# Patient Record
Sex: FEMALE | Race: BLACK OR AFRICAN AMERICAN | Employment: PART TIME | URBAN - METROPOLITAN AREA
[De-identification: names, ages, dates, MRNs, and addresses within clinical notes are randomized per-mention and may not be internally consistent; named-entity substitution may affect disease eponyms.]

---

## 2019-02-23 ENCOUNTER — APPOINTMENT (OUTPATIENT)
Dept: CT IMAGING | Age: 34
End: 2019-02-23
Attending: EMERGENCY MEDICINE
Payer: MEDICAID

## 2019-02-23 ENCOUNTER — APPOINTMENT (OUTPATIENT)
Dept: GENERAL RADIOLOGY | Age: 34
End: 2019-02-23
Attending: EMERGENCY MEDICINE
Payer: MEDICAID

## 2019-02-23 ENCOUNTER — HOSPITAL ENCOUNTER (EMERGENCY)
Age: 34
Discharge: HOME | End: 2019-02-23
Attending: EMERGENCY MEDICINE
Payer: MEDICAID

## 2019-02-23 VITALS
HEART RATE: 92 BPM | DIASTOLIC BLOOD PRESSURE: 87 MMHG | OXYGEN SATURATION: 100 % | SYSTOLIC BLOOD PRESSURE: 134 MMHG | RESPIRATION RATE: 16 BRPM | TEMPERATURE: 97.7 F

## 2019-02-23 DIAGNOSIS — Z79.4 TYPE 2 DIABETES MELLITUS WITH HYPERGLYCEMIA, WITH LONG-TERM CURRENT USE OF INSULIN (HCC): ICD-10-CM

## 2019-02-23 DIAGNOSIS — S09.90XA MINOR HEAD INJURY, INITIAL ENCOUNTER: Primary | ICD-10-CM

## 2019-02-23 DIAGNOSIS — E11.65 TYPE 2 DIABETES MELLITUS WITH HYPERGLYCEMIA, WITH LONG-TERM CURRENT USE OF INSULIN (HCC): ICD-10-CM
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
## 2019-02-23 DIAGNOSIS — S16.1XXA STRAIN OF NECK MUSCLE, INITIAL ENCOUNTER: ICD-10-CM

## 2019-02-23 DIAGNOSIS — S70.01XA CONTUSION OF RIGHT HIP, INITIAL ENCOUNTER: ICD-10-CM

## 2019-02-23 LAB
ANION GAP SERPL CALC-SCNC: 7 MMOL/L (ref 3–18)
APPEARANCE UR: CLEAR
BASOPHILS # BLD: 0 K/UL (ref 0–0.1)
BASOPHILS NFR BLD: 1 % (ref 0–2)
BILIRUB UR QL: NEGATIVE
BUN SERPL-MCNC: 17 MG/DL (ref 7–18)
BUN/CREAT SERPL: 31 (ref 12–20)
CALCIUM SERPL-MCNC: 9 MG/DL (ref 8.5–10.1)
CHLORIDE SERPL-SCNC: 104 MMOL/L (ref 100–108)
CO2 SERPL-SCNC: 26 MMOL/L (ref 21–32)
COLOR UR: YELLOW
CREAT SERPL-MCNC: 0.54 MG/DL (ref 0.6–1.3)
DIFFERENTIAL METHOD BLD: ABNORMAL
EOSINOPHIL # BLD: 0.1 K/UL (ref 0–0.4)
EOSINOPHIL NFR BLD: 3 % (ref 0–5)
ERYTHROCYTE [DISTWIDTH] IN BLOOD BY AUTOMATED COUNT: 12.3 % (ref 11.6–14.5)
ETHANOL SERPL-MCNC: 5 MG/DL (ref 0–3)
GLUCOSE BLD STRIP.AUTO-MCNC: 248 MG/DL (ref 70–110)
GLUCOSE SERPL-MCNC: 224 MG/DL (ref 74–99)
GLUCOSE UR STRIP.AUTO-MCNC: >1000 MG/DL
HCG SERPL QL: NEGATIVE
HCT VFR BLD AUTO: 42.2 % (ref 35–45)
HGB BLD-MCNC: 14 G/DL (ref 12–16)
HGB UR QL STRIP: NEGATIVE
KETONES UR QL STRIP.AUTO: NEGATIVE MG/DL
LEUKOCYTE ESTERASE UR QL STRIP.AUTO: NEGATIVE
LYMPHOCYTES # BLD: 1.7 K/UL (ref 0.9–3.6)
LYMPHOCYTES NFR BLD: 39 % (ref 21–52)
MCH RBC QN AUTO: 29.4 PG (ref 24–34)
MCHC RBC AUTO-ENTMCNC: 33.2 G/DL (ref 31–37)
MCV RBC AUTO: 88.5 FL (ref 74–97)
MONOCYTES # BLD: 0.3 K/UL (ref 0.05–1.2)
MONOCYTES NFR BLD: 7 % (ref 3–10)
NEUTS SEG # BLD: 2.2 K/UL (ref 1.8–8)
NEUTS SEG NFR BLD: 50 % (ref 40–73)
NITRITE UR QL STRIP.AUTO: NEGATIVE
PH UR STRIP: 6 [PH] (ref 5–8)
PLATELET # BLD AUTO: 352 K/UL (ref 135–420)
PMV BLD AUTO: 10.2 FL (ref 9.2–11.8)
POTASSIUM SERPL-SCNC: 3.8 MMOL/L (ref 3.5–5.5)
PROT UR STRIP-MCNC: NEGATIVE MG/DL
RBC # BLD AUTO: 4.77 M/UL (ref 4.2–5.3)
SODIUM SERPL-SCNC: 137 MMOL/L (ref 136–145)
SP GR UR REFRACTOMETRY: >1.03 (ref 1–1.03)
UROBILINOGEN UR QL STRIP.AUTO: 0.2 EU/DL (ref 0.2–1)
WBC # BLD AUTO: 4.4 K/UL (ref 4.6–13.2)

## 2019-02-23 PROCEDURE — 81003 URINALYSIS AUTO W/O SCOPE: CPT

## 2019-02-23 PROCEDURE — 84703 CHORIONIC GONADOTROPIN ASSAY: CPT

## 2019-02-23 PROCEDURE — 99285 EMERGENCY DEPT VISIT HI MDM: CPT

## 2019-02-23 PROCEDURE — 73502 X-RAY EXAM HIP UNI 2-3 VIEWS: CPT

## 2019-02-23 PROCEDURE — 72125 CT NECK SPINE W/O DYE: CPT

## 2019-02-23 PROCEDURE — 85025 COMPLETE CBC W/AUTO DIFF WBC: CPT

## 2019-02-23 PROCEDURE — 70450 CT HEAD/BRAIN W/O DYE: CPT

## 2019-02-23 PROCEDURE — 80048 BASIC METABOLIC PNL TOTAL CA: CPT

## 2019-02-23 PROCEDURE — 74011250637 HC RX REV CODE- 250/637: Performed by: EMERGENCY MEDICINE

## 2019-02-23 PROCEDURE — 82962 GLUCOSE BLOOD TEST: CPT

## 2019-02-23 PROCEDURE — 80307 DRUG TEST PRSMV CHEM ANLYZR: CPT

## 2019-02-23 RX ORDER — CARISOPRODOL 350 MG/1
350 TABLET ORAL
Qty: 30 TAB | Refills: 0 | Status: SHIPPED | OUTPATIENT
Start: 2019-02-23

## 2019-02-23 RX ORDER — ACETAMINOPHEN 325 MG/1
650 TABLET ORAL
Qty: 20 TAB | Refills: 0 | Status: SHIPPED | OUTPATIENT
Start: 2019-02-23

## 2019-02-23 RX ORDER — NAPROXEN 500 MG/1
500 TABLET ORAL 2 TIMES DAILY WITH MEALS
Qty: 20 TAB | Refills: 0 | Status: SHIPPED | OUTPATIENT
Start: 2019-02-23 | End: 2019-03-05

## 2019-02-23 RX ORDER — ACETAMINOPHEN 500 MG
1000 TABLET ORAL
Status: COMPLETED | OUTPATIENT
Start: 2019-02-23 | End: 2019-02-23

## 2019-02-23 RX ADMIN — ACETAMINOPHEN 1000 MG: 500 TABLET, FILM COATED ORAL at 02:17

## 2019-02-23 NOTE — LETTER
[facility] [place]  [facility] EMERGENCY DEPT  [address]  [address] [place] [address] [phone]    Work/School Note    Date: 2/23/2019    To Whom It May concern:    Jeremías Jean was seen and treated today in the emergency room by the following provider(s):  Attending Provider: Alee Bernnan MD.      Jeremías Jean may return to work on 02/25/2019. Sincerely,            Laverne Flood.  Avtar Damon MD
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
## 2019-02-23 NOTE — LETTER
[facility] [place]  [address] [facility] DEPT  [address]  [address] [place] [address] [phone]    Work/School Note    Date: 2/23/2019    To Whom It May concern:    Carmella Wilks was seen and treated today in the emergency room by the following :  Mike Mcclain MD.    Carmella Wilks may return to work on 2/28/2019.     Sincerely,        Mike Mcclain MD
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
## 2021-12-29 ENCOUNTER — HOSPITAL ENCOUNTER (EMERGENCY)
Age: 36
Discharge: HOME | End: 2021-12-29
Attending: EMERGENCY MEDICINE
Payer: COMMERCIAL

## 2021-12-29 VITALS
SYSTOLIC BLOOD PRESSURE: 128 MMHG | HEART RATE: 110 BPM | TEMPERATURE: 98.4 F | DIASTOLIC BLOOD PRESSURE: 84 MMHG | OXYGEN SATURATION: 99 % | RESPIRATION RATE: 16 BRPM

## 2021-12-29 DIAGNOSIS — Z20.822 PERSON UNDER INVESTIGATION FOR COVID-19: ICD-10-CM

## 2021-12-29 DIAGNOSIS — J11.1 INFLUENZA-LIKE ILLNESS: Primary | ICD-10-CM

## 2021-12-29 LAB
FLUAV AG NPH QL IA: NEGATIVE
FLUBV AG NOSE QL IA: NEGATIVE
SARS-COV-2, COV2: NORMAL

## 2021-12-29 PROCEDURE — 99281 EMR DPT VST MAYX REQ PHY/QHP: CPT

## 2021-12-29 PROCEDURE — U0003 INFECTIOUS AGENT DETECTION BY NUCLEIC ACID (DNA OR RNA); SEVERE ACUTE RESPIRATORY SYNDROME CORONAVIRUS 2 (SARS-COV-2) (CORONAVIRUS DISEASE [COVID-19]), AMPLIFIED PROBE TECHNIQUE, MAKING USE OF HIGH THROUGHPUT TECHNOLOGIES AS DESCRIBED BY CMS-2020-01-R: HCPCS

## 2021-12-29 PROCEDURE — 87804 INFLUENZA ASSAY W/OPTIC: CPT

## 2021-12-29 NOTE — Clinical Note
[facility] [place]  [facility] EMERGENCY DEPT  [address] [facility] [address] [phone]    Work/School Note    Date: 12/29/2021     To Whom It May concern:    Jazzmine Bernard was evaluated by the following provider(s):  Attending Provider: [place] virus is suspected. Per the CDC guidelines we recommend home isolation until the following conditions are all met:    1. At least 10 days have passed since symptoms first appeared and  2. At least 24 hours have passed since last fever without the use of fever-reducing medications and  3.  Symptoms (e.g., cough, shortness of breath) have improved      Sincerely,          Jesus Matthews LPN

## 2021-12-29 NOTE — Clinical Note
[facility] [place]  [facility] EMERGENCY DEPT  [address] [facility] NEWS [address]  [address] [phone]    Work/School Note    Date: 12/29/2021     To Whom It May concern:    Addis Gutierrez was evaluated by the following provider(s):  Attending Provider: Yoselyn Al [address] [place] [address] virus is suspected. Per the CDC guidelines we recommend home isolation until the following conditions are all met:    1. At least 10 days have passed since symptoms first appeared and  2. At least 24 hours have passed since last fever without the use of fever-reducing medications and  3.  Symptoms (e.g., cough, shortness of breath) have improved      Sincerely,          Carmen Pulido MD

## 2021-12-30 ENCOUNTER — PATIENT OUTREACH (OUTPATIENT)
Dept: CASE MANAGEMENT | Age: 36
End: 2021-12-30

## 2021-12-30 LAB — SARS-COV-2, NAA: DETECTED

## 2021-12-30 NOTE — ED TRIAGE NOTES
\" I have been having body aches, cough, sore throat and chills since yesterday. \" Pt is vaccinated vss nad noted

## 2021-12-30 NOTE — PROGRESS NOTES
Patient contacted regarding COVID-19 risk. Discussed COVID-19 related testing which was pending at this time. Test results were pending. Patient informed of results, if available? pending. Outreach made within 2 business days of discharge: Yes     contacted the patient by telephone to perform post discharge call. Verified name and  with patient as identifiers. Provided introduction to self, and reason for call due to viral symptoms of infection and/or exposure to COVID-19. Advance Care Planning:   Does patient have an Advance Directive: currently not on file; education provided     Patient presented to emergency department/flu clinic with complaints of viral symptoms/exposure to COVID. Patient reports symptoms are the same. Due to no new or worsening symptoms the RN CTN/ACM was not notified for escalation. This author reviewed discharge instructions, medical action plan and red flags such as increased shortness of breath, increasing fever, worsening cough or chest pain with patient who verbalized understanding. Discussed exposure protocols and quarantine with CDC Guidelines What To Do If You Are Sick    Patient who was given an opportunity for questions and concerns. The patient agrees to contact their health care provider for questions related to their healthcare. Author provided contact information for future reference. Plan for follow-up call in 5-7 days based on severity of symptoms and risk factors.

## 2021-12-30 NOTE — ED PROVIDER NOTES
EMERGENCY DEPARTMENT HISTORY AND PHYSICAL EXAM    Date: 12/29/2021  Patient Name: Chiquis Ulloa    History of Presenting Illness     Chief Complaint   Patient presents with    Flu Like Symptoms         History Provided By: Patient    8:12 PM  Chiquis Ulloa is a 39 y.o. female with PMHX of diabetes, fully vaccinated for COVID-19 but has not yet gotten booster, diabetes who presents to the emergency department C/O cough, body aches, rhinorrhea, sore throat, congestion. Patient reports the cough and sore throat started a week ago and the rest of the symptoms started yesterday. No clear relieving or exacerbating factors identified. Denies any shortness of breath, fever, chest pain, diarrhea, vomiting. Has had contacts with patients with similar symptoms. She does not smoke. PCP: No primary care provider on file. Past History       Past Medical History:  No past medical history on file. Past Surgical History:  No past surgical history on file. Family History:  No family history on file. Social History:  Social History     Tobacco Use    Smoking status: Not on file    Smokeless tobacco: Not on file   Substance Use Topics    Alcohol use: Not on file    Drug use: Not on file       Allergies:  No Known Allergies      Review of Systems   Review of Systems   Constitutional: Negative for fever. HENT: Positive for congestion, rhinorrhea and sore throat. Respiratory: Positive for cough. Negative for shortness of breath. Cardiovascular: Negative for chest pain. Musculoskeletal: Positive for arthralgias and myalgias. All other systems reviewed and are negative.         Physical Exam     Vitals:    12/29/21 1938   BP: 128/84   Pulse: (!) 110   Resp: 16   Temp: 98.4 °F (36.9 °C)   SpO2: 99%     Physical Exam    Nursing notes and vital signs reviewed    Constitutional: Non toxic appearing, moderate distress  Head: Normocephalic, Atraumatic  Eyes: EOMI  Neck: Supple  Cardiovascular: Tachycardic and regular rhythm, no murmurs, rubs, or gallops  Chest: Normal work of breathing and chest excursion bilaterally  Lungs: Clear to ausculation bilaterally  Abdomen: Soft, non tender, non distended  Back: No evidence of trauma or deformity  Extremities: No evidence of trauma or deformity  Skin: Warm and dry, normal cap refill  Neuro: Alert and appropriate  Psychiatric: Normal mood and affect      Diagnostic Study Results     Labs -   No results found for this or any previous visit (from the past 12 hour(s)). Radiologic Studies -   No orders to display     CT Results  (Last 48 hours)    None        CXR Results  (Last 48 hours)    None          Medications given in the ED-  Medications - No data to display      Medical Decision Making   I am the first provider for this patient. I reviewed the vital signs, available nursing notes, past medical history, past surgical history, family history and social history. Vital Signs-Reviewed the patient's vital signs. Pulse Oximetry Analysis - 99% on room air, not hypoxic     Records Reviewed: Nursing Notes    Provider Notes (Medical Decision Making): Dennys Castillo is a 39 y.o. female presenting with URI signs and symptoms in the setting of high community prevalence of COVID-19. Patient is hemodynamically stable except for mild tachycardia without evidence of respiratory distress. Not hypoxic on room air. Covid-19 testing was sent and is pending. Plan for discharge with strict quarantine instructions and return precautions. Patient understands and agrees with this plan. Procedures:  Procedures    ED Course:       Diagnosis and Disposition     Critical Care: None    DISCHARGE NOTE:    Bhargav Arguelles's  results have been reviewed with her. She has been counseled regarding her diagnosis, treatment, and plan.   She verbally conveys understanding and agreement of the signs, symptoms, diagnosis, treatment and prognosis and additionally agrees to follow up as discussed. She also agrees with the care-plan and conveys that all of her questions have been answered. I have also provided discharge instructions for her that include: educational information regarding their diagnosis and treatment, and list of reasons why they would want to return to the ED prior to their follow-up appointment, should her condition change. She has been provided with education for proper emergency department utilization. CLINICAL IMPRESSION:    1. Influenza-like illness    2. Person under investigation for COVID-19        PLAN:  1. D/C Home  2. There are no discharge medications for this patient. 3.   Follow-up Information     Follow up With Specialties Details Why Contact Info    Chris Marie MD Family Medicine Schedule an appointment as soon as possible for a visit  Or Your Primary Care Doctor [address] [place] [id number] [phone]      [facility] EMERGENCY DEPT Emergency Medicine  If symptoms worsen [address] [address]  [phone]        _______________________________      Please note that this dictation was completed with [employer], the computer voice recognition software. Quite often unanticipated grammatical, syntax, homophones, and other interpretive errors are inadvertently transcribed by the computer software. Please disregard these errors. Please excuse any errors that have escaped final proofreading.

## 2021-12-30 NOTE — PROGRESS NOTES
Unable to reach     Attempted to contact the patient by telephone. Left message with information to return call. Will attempt to contact the patient at a later time.

## 2022-01-06 ENCOUNTER — PATIENT OUTREACH (OUTPATIENT)
Dept: CASE MANAGEMENT | Age: 37
End: 2022-01-06

## 2022-01-06 NOTE — PROGRESS NOTES
Patient contacted regarding COVID-19 risk. Discussed COVID-19 related testing which was available at this time. Test results were positive. Patient informed of results, if available? yes       contacted the patient by telephone to perform follow-up assessment. Verified name and  with patient as identifiers. Patient has following risk factors of: diabetes. Symptoms reviewed with patient who verbalized the following symptoms: cough and Sore throat, congestion, body aches. Due to no new or worsening symptoms encounter was not routed to provider for escalation. Interventions to address risk factors: Scheduled appointment with PCP-Will schedule    Educated patient about risk for severe COVID-19 due to risk factors according to CDC guidelines.  reviewed discharge instructions, medical action plan and red flag symptoms with the patient who verbalized understanding. Discussed COVID vaccination status: yes. Education provided on COVID-19 vaccination as appropriate. Discussed exposure protocols and quarantine with CDC Guidelines. Patient was given an opportunity to verbalize any questions and concerns and agrees to contact  or health care provider for questions related to their healthcare. Reviewed and educated patient on any new and changed medications related to discharge diagnosis     Was patient discharged with a pulse oximeter? no Discussed and confirmed pulse oximeter discharge instructions and when to notify provider or seek emergency care.  provided contact information. No further follow-up call identified based on severity of symptoms and risk factors.

## 2023-03-25 ENCOUNTER — HOSPITAL ENCOUNTER (EMERGENCY)
Facility: HOSPITAL | Age: 38
Discharge: HOME | End: 2023-03-25
Attending: EMERGENCY MEDICINE

## 2023-03-25 VITALS
RESPIRATION RATE: 20 BRPM | SYSTOLIC BLOOD PRESSURE: 126 MMHG | BODY MASS INDEX: 22.32 KG/M2 | DIASTOLIC BLOOD PRESSURE: 75 MMHG | HEART RATE: 103 BPM | WEIGHT: 126 LBS | TEMPERATURE: 98.1 F | OXYGEN SATURATION: 98 % | HEIGHT: 63 IN

## 2023-03-25 DIAGNOSIS — S13.9XXA NECK SPRAIN, INITIAL ENCOUNTER: ICD-10-CM
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
## 2023-03-25 DIAGNOSIS — S33.5XXA LUMBAR SPRAIN, INITIAL ENCOUNTER: ICD-10-CM

## 2023-03-25 LAB
GLUCOSE BLD STRIP.AUTO-MCNC: 169 MG/DL (ref 70–110)
GLUCOSE BLD-MCNC: 169 MG/DL

## 2023-03-25 PROCEDURE — 6360000002 HC RX W HCPCS: Performed by: EMERGENCY MEDICINE

## 2023-03-25 PROCEDURE — 82962 GLUCOSE BLOOD TEST: CPT

## 2023-03-25 PROCEDURE — 96372 THER/PROPH/DIAG INJ SC/IM: CPT

## 2023-03-25 PROCEDURE — 99284 EMERGENCY DEPT VISIT MOD MDM: CPT

## 2023-03-25 RX ORDER — CYCLOBENZAPRINE HCL 10 MG
10 TABLET ORAL 3 TIMES DAILY PRN
Qty: 21 TABLET | Refills: 0 | Status: SHIPPED | OUTPATIENT
Start: 2023-03-25 | End: 2023-03-25 | Stop reason: SDUPTHER

## 2023-03-25 RX ORDER — KETOROLAC TROMETHAMINE 30 MG/ML
60 INJECTION, SOLUTION INTRAMUSCULAR; INTRAVENOUS
Status: COMPLETED | OUTPATIENT
Start: 2023-03-25 | End: 2023-03-25

## 2023-03-25 RX ORDER — KETOROLAC TROMETHAMINE 10 MG/1
10 TABLET, FILM COATED ORAL EVERY 8 HOURS PRN
Qty: 15 TABLET | Refills: 0 | Status: SHIPPED | OUTPATIENT
Start: 2023-03-25 | End: 2023-03-25 | Stop reason: SDUPTHER

## 2023-03-25 RX ORDER — KETOROLAC TROMETHAMINE 10 MG/1
10 TABLET, FILM COATED ORAL EVERY 8 HOURS PRN
Qty: 15 TABLET | Refills: 0 | Status: SHIPPED | OUTPATIENT
Start: 2023-03-25

## 2023-03-25 RX ORDER — CYCLOBENZAPRINE HCL 10 MG
10 TABLET ORAL 3 TIMES DAILY PRN
Qty: 21 TABLET | Refills: 0 | Status: SHIPPED | OUTPATIENT
Start: 2023-03-25 | End: 2023-04-04

## 2023-03-25 RX ADMIN — KETOROLAC TROMETHAMINE 60 MG: 30 INJECTION, SOLUTION INTRAMUSCULAR at 05:52

## 2023-03-25 NOTE — Clinical Note
Dariel Shone was seen and treated in our emergency department on 3/25/2023. She may return to work on . If you have any questions or concerns, please don't hesitate to call.       Bibi Mcgarry MD

## 2023-03-25 NOTE — Clinical Note
Manuel Conner was seen and treated in our emergency department on 3/25/2023. She may return to work on 04/17/2023. If you have any questions or concerns, please don't hesitate to call.       Austen Jane MD

## 2023-03-25 NOTE — Clinical Note
Queen Justice was seen and treated in our emergency department on 3/25/2023. She may return to work on . If you have any questions or concerns, please don't hesitate to call.       Sam Urrutia MD
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
## 2023-03-25 NOTE — Clinical Note
Margarito Peraza was seen and treated in our emergency department on 3/25/2023. She may return to work on 03/27/2023. If you have any questions or concerns, please don't hesitate to call.       Aracelis Colon MD

## 2023-03-25 NOTE — Clinical Note
Charles Lawton was seen and treated in our emergency department on 3/25/2023. She may return to work on 03/27/2023. If you have any questions or concerns, please don't hesitate to call.       Deepika Phoenix MD

## 2023-03-25 NOTE — ED PROVIDER NOTES
Normal work of breathing and chest excursion bilaterally  Lungs: Clear to ausculation bilaterally  Abdomen: Soft, non tender, non distended, normoactive bowel sounds  Back: No evidence of trauma or deformity  Extremities: No evidence of trauma or deformity, no LE edema  Skin: Warm and dry, normal cap refill  Neuro: Alert and appropriate, CN intact, normal speech, strength and sensation full and symmetric bilaterally, normal gait, normal coordination  Psychiatric: Normal mood and affect     DIAGNOSTIC RESULTS   LABS:     Labs Reviewed   POCT GLUCOSE - Abnormal; Notable for the following components:       Result Value    POC Glucose 169 (*)     All other components within normal limits   POCT GLUCOSE - Normal        EKG:   EKG interpretation: (Preliminary)  EKG read by Dr. Cee Iglesias ***     RADIOLOGY:  Non-plain film images such as CT, Ultrasound and MRI are read by the radiologist. Plain radiographic images are visualized and preliminarily interpreted by the ED Provider with the below findings:     ***     Interpretation per the Radiologist below, if available at the time of this note:     No orders to display        PROCEDURES   Unless otherwise noted below, none  Procedures     CRITICAL CARE TIME   ***    EMERGENCY DEPARTMENT COURSE and DIFFERENTIAL DIAGNOSIS/MDM   Vitals:    Vitals:    03/25/23 0356   BP: 126/75   Pulse: (!) 103   Resp: 20   Temp: 98.1 °F (36.7 °C)   TempSrc: Temporal   SpO2: 98%   Weight: 126 lb (57.2 kg)   Height: 5' 3\" (1.6 m)        Patient was given the following medications:  Medications   ketorolac (TORADOL) injection 60 mg (60 mg IntraMUSCular Given 3/25/23 0597)       CONSULTS: (Who and What was discussed)  None    Chronic Conditions: ***    Social Determinants affecting Dx or Tx: {Social Barriers:10665}    Records Reviewed (source and summary): {Records review:65434::\"Old medical records. \",\"Previous electrocardiograms. \",\"Nursing notes. \",\"Previous radiology (TYLENOL) 325 MG tablet Take 650 mg by mouth every 4 hours as neededHistorical Med      carisoprodol (SOMA) 350 MG tablet Take 350 mg by mouth every 8 hours as needed. Historical Med             DISCONTINUED MEDICATIONS:  Discharge Medication List as of 3/25/2023  6:19 AM          I am the Primary Clinician of Record. Soto Terrazas MD (electronically signed)    (Please note that parts of this dictation were completed with voice recognition software. Quite often unanticipated grammatical, syntax, homophones, and other interpretive errors are inadvertently transcribed by the computer software. Please disregards these errors.  Please excuse any errors that have escaped final proofreading.)       Pascual Bridges MD  03/29/23 8107

## 2023-03-25 NOTE — Clinical Note
Annelise Vallejo was seen and treated in our emergency department on 3/25/2023. She may return to work on . If you have any questions or concerns, please don't hesitate to call.       Alvin Awad MD

## 2024-05-24 ENCOUNTER — HOSPITAL ENCOUNTER (OUTPATIENT)
Facility: HOSPITAL | Age: 39
Discharge: HOME | End: 2024-05-27

## 2024-05-24 ENCOUNTER — HOSPITAL ENCOUNTER (OUTPATIENT)
Facility: HOSPITAL | Age: 39
End: 2024-05-24
Payer: MEDICAID

## 2024-05-24 ENCOUNTER — TRANSCRIBE ORDERS (OUTPATIENT)
Facility: HOSPITAL | Age: 39
End: 2024-05-24

## 2024-05-24 DIAGNOSIS — S82.002A CLOSED NONDISPLACED FRACTURE OF LEFT PATELLA, UNSPECIFIED FRACTURE MORPHOLOGY, INITIAL ENCOUNTER: Primary | ICD-10-CM

## 2024-05-24 DIAGNOSIS — S82.002A CLOSED NONDISPLACED FRACTURE OF LEFT PATELLA, UNSPECIFIED FRACTURE MORPHOLOGY, INITIAL ENCOUNTER: ICD-10-CM

## 2024-05-24 LAB
EKG ATRIAL RATE: 103 BPM
EKG DIAGNOSIS: NORMAL
EKG P AXIS: 43 DEGREES
EKG P-R INTERVAL: 140 MS
EKG Q-T INTERVAL: 332 MS
EKG QRS DURATION: 80 MS
EKG QTC CALCULATION (BAZETT): 434 MS
EKG R AXIS: 88 DEGREES
EKG T AXIS: 21 DEGREES
EKG VENTRICULAR RATE: 103 BPM
SENTARA SPECIMEN COLLECTION: NORMAL

## 2024-05-24 PROCEDURE — 99001 SPECIMEN HANDLING PT-LAB: CPT

## 2024-05-24 PROCEDURE — 93010 ELECTROCARDIOGRAM REPORT: CPT | Performed by: INTERNAL MEDICINE

## 2024-05-24 PROCEDURE — 93005 ELECTROCARDIOGRAM TRACING: CPT

## 2024-05-29 ENCOUNTER — ANESTHESIA EVENT (OUTPATIENT)
Facility: HOSPITAL | Age: 39
End: 2024-05-29
Payer: MEDICAID

## 2024-05-29 NOTE — PERIOP NOTE
Hga1c is 10.7. Faxed HgA1C report to office 5-28-24. [facility] for Danelle 5-29-24

## 2024-05-29 NOTE — PERIOP NOTE
Faxed a message and LVM for Danelle at Dr. Hicks with Dr. Green's comment \"Better glucose control would be optimal but cleared if DOS blood glucose in range given this is not elective\". Informed her that if blood glucose is too high on dos, the patient could be postpone. Please ask patient to be mindful of diet and diabetes management in the days leading up to surgery.
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
## 2024-05-31 NOTE — DISCHARGE INSTRUCTIONS
Dr. Hicks's Post-Operative Instructions Patella Fracture    Diet:  1. Begin with liquids and light foods such as Jell-O and soups.  2. Advance as tolerated to your regular diet if not nauseated.    First 24 hours:  1. Be in the care of a responsible adult.  2. Do not drive or operate machinery.  3. Do not drink alcoholic beverages.    Activities:  1. Elevate the limb above hip and preferably above chest for 48 hours.  2. Ice should be applied to the knee  in a waterproof bag for 15-30 minutes each hour while awake for first 48 hours.  3. Do not bear weight on affected limb  4. Follow-up with Dr. Hicks in 7-10 days post-operatively.    Wound Care:  1. Maintain your postoperative dressing until follow-up.  2. Keep the surgical incisions dry until your sutures are removed when you see your doctor. Use a plastic bag with rubber bands to cover the limb during showers. Immersing the limb in water is to be avoided.    Medications:  1. Strong oral narcotic pain medications have been prescribed for the first few days. Use only as directed. No pain medication is capable of taking away all the pain. Taking your pills at regular intervals will give you the best chance of having less pain.  2. If you need a refill PLEASE PLAN AHEAD. Call our office during regular hours (8-5).  3. Do not combine with alcoholic beverages.  4. Be careful as you walk, climb stairs or drive as mild dizziness is not unusual.  5. Do not take medications that have not been prescribed by your surgeon.  6. You may switch to over the counter pain medication of your choice as you become more comfortable.    WHEN TO CALL YOUR SURGEON:  1. Significant swelling or any new numbness in the limb that was operated on  2. Unrelenting pain  3. Fever or Chills  4. Redness around incisions  5. Color change in foot or toes  6. Continuous drainage or bleeding from wounds (a small amount of drainage is expected)  7. Any other worrisome condition    WHEN TO CALL

## 2024-06-04 ENCOUNTER — HOSPITAL ENCOUNTER (OUTPATIENT)
Facility: HOSPITAL | Age: 39
Setting detail: OUTPATIENT SURGERY
Discharge: HOME | End: 2024-06-04
Attending: ORTHOPAEDIC SURGERY | Admitting: ORTHOPAEDIC SURGERY
Payer: MEDICAID

## 2024-06-04 ENCOUNTER — APPOINTMENT (OUTPATIENT)
Facility: HOSPITAL | Age: 39
End: 2024-06-04
Attending: ORTHOPAEDIC SURGERY
Payer: MEDICAID

## 2024-06-04 ENCOUNTER — ANESTHESIA (OUTPATIENT)
Facility: HOSPITAL | Age: 39
End: 2024-06-04
Payer: MEDICAID

## 2024-06-04 VITALS
HEIGHT: 63 IN | OXYGEN SATURATION: 98 % | RESPIRATION RATE: 14 BRPM | WEIGHT: 127.1 LBS | SYSTOLIC BLOOD PRESSURE: 129 MMHG | TEMPERATURE: 98 F | HEART RATE: 108 BPM | DIASTOLIC BLOOD PRESSURE: 85 MMHG | BODY MASS INDEX: 22.52 KG/M2

## 2024-06-04 LAB
GLUCOSE BLD STRIP.AUTO-MCNC: 120 MG/DL (ref 70–110)
GLUCOSE BLD STRIP.AUTO-MCNC: 215 MG/DL (ref 70–110)
HCG UR QL: NEGATIVE

## 2024-06-04 PROCEDURE — 82962 GLUCOSE BLOOD TEST: CPT

## 2024-06-04 PROCEDURE — 3600000002 HC SURGERY LEVEL 2 BASE: Performed by: ORTHOPAEDIC SURGERY

## 2024-06-04 PROCEDURE — 6360000002 HC RX W HCPCS: Performed by: SPECIALIST

## 2024-06-04 PROCEDURE — 2720000010 HC SURG SUPPLY STERILE: Performed by: ORTHOPAEDIC SURGERY

## 2024-06-04 PROCEDURE — 6360000002 HC RX W HCPCS: Performed by: REGISTERED NURSE

## 2024-06-04 PROCEDURE — 2709999900 HC NON-CHARGEABLE SUPPLY: Performed by: ORTHOPAEDIC SURGERY

## 2024-06-04 PROCEDURE — 2580000003 HC RX 258: Performed by: ORTHOPAEDIC SURGERY

## 2024-06-04 PROCEDURE — C1713 ANCHOR/SCREW BN/BN,TIS/BN: HCPCS | Performed by: ORTHOPAEDIC SURGERY

## 2024-06-04 PROCEDURE — 7100000000 HC PACU RECOVERY - FIRST 15 MIN: Performed by: ORTHOPAEDIC SURGERY

## 2024-06-04 PROCEDURE — 3700000000 HC ANESTHESIA ATTENDED CARE: Performed by: ORTHOPAEDIC SURGERY

## 2024-06-04 PROCEDURE — 7100000001 HC PACU RECOVERY - ADDTL 15 MIN: Performed by: ORTHOPAEDIC SURGERY

## 2024-06-04 PROCEDURE — 73560 X-RAY EXAM OF KNEE 1 OR 2: CPT

## 2024-06-04 PROCEDURE — 3600000012 HC SURGERY LEVEL 2 ADDTL 15MIN: Performed by: ORTHOPAEDIC SURGERY

## 2024-06-04 PROCEDURE — 3700000001 HC ADD 15 MINUTES (ANESTHESIA): Performed by: ORTHOPAEDIC SURGERY

## 2024-06-04 PROCEDURE — 7100000010 HC PHASE II RECOVERY - FIRST 15 MIN: Performed by: ORTHOPAEDIC SURGERY

## 2024-06-04 PROCEDURE — 6370000000 HC RX 637 (ALT 250 FOR IP): Performed by: ORTHOPAEDIC SURGERY

## 2024-06-04 PROCEDURE — 6360000002 HC RX W HCPCS: Performed by: ORTHOPAEDIC SURGERY

## 2024-06-04 PROCEDURE — 64447 NJX AA&/STRD FEMORAL NRV IMG: CPT | Performed by: SPECIALIST

## 2024-06-04 PROCEDURE — 7100000011 HC PHASE II RECOVERY - ADDTL 15 MIN: Performed by: ORTHOPAEDIC SURGERY

## 2024-06-04 PROCEDURE — 81025 URINE PREGNANCY TEST: CPT

## 2024-06-04 PROCEDURE — C1769 GUIDE WIRE: HCPCS | Performed by: ORTHOPAEDIC SURGERY

## 2024-06-04 PROCEDURE — 2500000003 HC RX 250 WO HCPCS: Performed by: REGISTERED NURSE

## 2024-06-04 PROCEDURE — 2500000003 HC RX 250 WO HCPCS: Performed by: ORTHOPAEDIC SURGERY

## 2024-06-04 PROCEDURE — 6370000000 HC RX 637 (ALT 250 FOR IP): Performed by: SPECIALIST

## 2024-06-04 DEVICE — CANNULATED SCREW
Type: IMPLANTABLE DEVICE | Site: PATELLA | Status: FUNCTIONAL
Brand: ASNIS

## 2024-06-04 RX ORDER — TRANEXAMIC ACID 10 MG/ML
1000 INJECTION, SOLUTION INTRAVENOUS ONCE
Status: DISCONTINUED | OUTPATIENT
Start: 2024-06-04 | End: 2024-06-04 | Stop reason: HOSPADM

## 2024-06-04 RX ORDER — SODIUM CHLORIDE, SODIUM LACTATE, POTASSIUM CHLORIDE, CALCIUM CHLORIDE 600; 310; 30; 20 MG/100ML; MG/100ML; MG/100ML; MG/100ML
INJECTION, SOLUTION INTRAVENOUS CONTINUOUS
Status: DISCONTINUED | OUTPATIENT
Start: 2024-06-04 | End: 2024-06-04 | Stop reason: HOSPADM

## 2024-06-04 RX ORDER — SODIUM CHLORIDE 0.9 % (FLUSH) 0.9 %
5-40 SYRINGE (ML) INJECTION EVERY 12 HOURS SCHEDULED
Status: DISCONTINUED | OUTPATIENT
Start: 2024-06-04 | End: 2024-06-04 | Stop reason: HOSPADM

## 2024-06-04 RX ORDER — MIDAZOLAM HYDROCHLORIDE 1 MG/ML
INJECTION INTRAMUSCULAR; INTRAVENOUS PRN
Status: DISCONTINUED | OUTPATIENT
Start: 2024-06-04 | End: 2024-06-04 | Stop reason: SDUPTHER

## 2024-06-04 RX ORDER — SODIUM CHLORIDE 0.9 % (FLUSH) 0.9 %
5-40 SYRINGE (ML) INJECTION PRN
Status: DISCONTINUED | OUTPATIENT
Start: 2024-06-04 | End: 2024-06-04 | Stop reason: HOSPADM

## 2024-06-04 RX ORDER — SODIUM CHLORIDE 9 MG/ML
INJECTION, SOLUTION INTRAVENOUS PRN
Status: DISCONTINUED | OUTPATIENT
Start: 2024-06-04 | End: 2024-06-04 | Stop reason: HOSPADM

## 2024-06-04 RX ORDER — ONDANSETRON 2 MG/ML
4 INJECTION INTRAMUSCULAR; INTRAVENOUS
Status: DISCONTINUED | OUTPATIENT
Start: 2024-06-04 | End: 2024-06-04 | Stop reason: HOSPADM

## 2024-06-04 RX ORDER — FENTANYL CITRATE 50 UG/ML
25 INJECTION, SOLUTION INTRAMUSCULAR; INTRAVENOUS EVERY 5 MIN PRN
Status: DISCONTINUED | OUTPATIENT
Start: 2024-06-04 | End: 2024-06-04 | Stop reason: HOSPADM

## 2024-06-04 RX ORDER — LIDOCAINE HYDROCHLORIDE 20 MG/ML
INJECTION, SOLUTION EPIDURAL; INFILTRATION; INTRACAUDAL; PERINEURAL PRN
Status: DISCONTINUED | OUTPATIENT
Start: 2024-06-04 | End: 2024-06-04 | Stop reason: SDUPTHER

## 2024-06-04 RX ORDER — DIPHENHYDRAMINE HYDROCHLORIDE 50 MG/ML
12.5 INJECTION INTRAMUSCULAR; INTRAVENOUS
Status: DISCONTINUED | OUTPATIENT
Start: 2024-06-04 | End: 2024-06-04 | Stop reason: HOSPADM

## 2024-06-04 RX ORDER — MIDAZOLAM HYDROCHLORIDE 2 MG/2ML
INJECTION, SOLUTION INTRAMUSCULAR; INTRAVENOUS
Status: COMPLETED
Start: 2024-06-04 | End: 2024-06-04

## 2024-06-04 RX ORDER — METOCLOPRAMIDE HYDROCHLORIDE 5 MG/ML
INJECTION INTRAMUSCULAR; INTRAVENOUS PRN
Status: DISCONTINUED | OUTPATIENT
Start: 2024-06-04 | End: 2024-06-04 | Stop reason: SDUPTHER

## 2024-06-04 RX ORDER — FENTANYL CITRATE 50 UG/ML
INJECTION, SOLUTION INTRAMUSCULAR; INTRAVENOUS PRN
Status: DISCONTINUED | OUTPATIENT
Start: 2024-06-04 | End: 2024-06-04 | Stop reason: SDUPTHER

## 2024-06-04 RX ORDER — NALOXONE HYDROCHLORIDE 0.4 MG/ML
INJECTION, SOLUTION INTRAMUSCULAR; INTRAVENOUS; SUBCUTANEOUS PRN
Status: DISCONTINUED | OUTPATIENT
Start: 2024-06-04 | End: 2024-06-04 | Stop reason: HOSPADM

## 2024-06-04 RX ORDER — PROPOFOL 10 MG/ML
INJECTION, EMULSION INTRAVENOUS PRN
Status: DISCONTINUED | OUTPATIENT
Start: 2024-06-04 | End: 2024-06-04 | Stop reason: SDUPTHER

## 2024-06-04 RX ORDER — ACETAMINOPHEN 325 MG/1
650 TABLET ORAL
Status: DISCONTINUED | OUTPATIENT
Start: 2024-06-04 | End: 2024-06-04 | Stop reason: HOSPADM

## 2024-06-04 RX ORDER — GLYCOPYRROLATE 0.2 MG/ML
INJECTION INTRAMUSCULAR; INTRAVENOUS PRN
Status: DISCONTINUED | OUTPATIENT
Start: 2024-06-04 | End: 2024-06-04 | Stop reason: SDUPTHER

## 2024-06-04 RX ORDER — ONDANSETRON 2 MG/ML
INJECTION INTRAMUSCULAR; INTRAVENOUS PRN
Status: DISCONTINUED | OUTPATIENT
Start: 2024-06-04 | End: 2024-06-04 | Stop reason: SDUPTHER

## 2024-06-04 RX ORDER — MELOXICAM 15 MG/1
15 TABLET ORAL DAILY
Qty: 30 TABLET | Refills: 0
Start: 2024-06-04 | End: 2024-07-04

## 2024-06-04 RX ORDER — ASPIRIN 81 MG/1
81 TABLET, CHEWABLE ORAL EVERY 12 HOURS
Qty: 42 TABLET | Refills: 0
Start: 2024-06-04 | End: 2024-06-25

## 2024-06-04 RX ORDER — PHENYLEPHRINE HCL IN 0.9% NACL 1 MG/10 ML
SYRINGE (ML) INTRAVENOUS PRN
Status: DISCONTINUED | OUTPATIENT
Start: 2024-06-04 | End: 2024-06-04 | Stop reason: SDUPTHER

## 2024-06-04 RX ORDER — CEPHALEXIN 500 MG/1
1000 CAPSULE ORAL EVERY 8 HOURS
Qty: 4 CAPSULE | Refills: 0
Start: 2024-06-04 | End: 2024-06-05

## 2024-06-04 RX ORDER — HYDROMORPHONE HYDROCHLORIDE 1 MG/ML
0.5 INJECTION, SOLUTION INTRAMUSCULAR; INTRAVENOUS; SUBCUTANEOUS EVERY 5 MIN PRN
Status: DISCONTINUED | OUTPATIENT
Start: 2024-06-04 | End: 2024-06-04 | Stop reason: HOSPADM

## 2024-06-04 RX ORDER — LABETALOL HYDROCHLORIDE 5 MG/ML
5 INJECTION, SOLUTION INTRAVENOUS
Status: DISCONTINUED | OUTPATIENT
Start: 2024-06-04 | End: 2024-06-04 | Stop reason: HOSPADM

## 2024-06-04 RX ORDER — MIDAZOLAM HYDROCHLORIDE 2 MG/2ML
2 INJECTION, SOLUTION INTRAMUSCULAR; INTRAVENOUS
Status: DISCONTINUED | OUTPATIENT
Start: 2024-06-04 | End: 2024-06-04 | Stop reason: HOSPADM

## 2024-06-04 RX ORDER — INSULIN LISPRO 100 [IU]/ML
0-16 INJECTION, SOLUTION INTRAVENOUS; SUBCUTANEOUS ONCE
Status: COMPLETED | OUTPATIENT
Start: 2024-06-04 | End: 2024-06-04

## 2024-06-04 RX ORDER — SODIUM CHLORIDE, SODIUM LACTATE, POTASSIUM CHLORIDE, AND CALCIUM CHLORIDE .6; .31; .03; .02 G/100ML; G/100ML; G/100ML; G/100ML
IRRIGANT IRRIGATION PRN
Status: DISCONTINUED | OUTPATIENT
Start: 2024-06-04 | End: 2024-06-04 | Stop reason: ALTCHOICE

## 2024-06-04 RX ORDER — ROPIVACAINE HYDROCHLORIDE 5 MG/ML
INJECTION, SOLUTION EPIDURAL; INFILTRATION; PERINEURAL
Status: COMPLETED | OUTPATIENT
Start: 2024-06-04 | End: 2024-06-04

## 2024-06-04 RX ORDER — BUPIVACAINE HYDROCHLORIDE 5 MG/ML
INJECTION, SOLUTION EPIDURAL; INTRACAUDAL PRN
Status: DISCONTINUED | OUTPATIENT
Start: 2024-06-04 | End: 2024-06-04 | Stop reason: ALTCHOICE

## 2024-06-04 RX ADMIN — INSULIN LISPRO 4 UNITS: 100 INJECTION, SOLUTION INTRAVENOUS; SUBCUTANEOUS at 15:07

## 2024-06-04 RX ADMIN — WATER 2000 MG: 1 INJECTION INTRAMUSCULAR; INTRAVENOUS; SUBCUTANEOUS at 13:14

## 2024-06-04 RX ADMIN — HYDROMORPHONE HYDROCHLORIDE 0.5 MG: 1 INJECTION, SOLUTION INTRAMUSCULAR; INTRAVENOUS; SUBCUTANEOUS at 15:19

## 2024-06-04 RX ADMIN — ROPIVACAINE HYDROCHLORIDE 20 ML: 5 INJECTION, SOLUTION EPIDURAL; INFILTRATION; PERINEURAL at 12:25

## 2024-06-04 RX ADMIN — SODIUM CHLORIDE, SODIUM LACTATE, POTASSIUM CHLORIDE, AND CALCIUM CHLORIDE: 600; 310; 30; 20 INJECTION, SOLUTION INTRAVENOUS at 10:18

## 2024-06-04 RX ADMIN — HYDROMORPHONE HYDROCHLORIDE 0.5 MG: 1 INJECTION, SOLUTION INTRAMUSCULAR; INTRAVENOUS; SUBCUTANEOUS at 15:24

## 2024-06-04 RX ADMIN — MEPIVACAINE HYDROCHLORIDE 10 ML: 15 INJECTION, SOLUTION EPIDURAL; INFILTRATION at 12:25

## 2024-06-04 RX ADMIN — Medication 50 MCG: at 13:42

## 2024-06-04 RX ADMIN — PROPOFOL 150 MG: 10 INJECTION, EMULSION INTRAVENOUS at 13:04

## 2024-06-04 RX ADMIN — Medication 100 MCG: at 14:18

## 2024-06-04 RX ADMIN — METOCLOPRAMIDE 5 MG: 5 INJECTION, SOLUTION INTRAMUSCULAR; INTRAVENOUS at 13:29

## 2024-06-04 RX ADMIN — Medication 50 MCG: at 13:20

## 2024-06-04 RX ADMIN — Medication 100 MCG: at 13:58

## 2024-06-04 RX ADMIN — Medication 100 MCG: at 14:06

## 2024-06-04 RX ADMIN — MIDAZOLAM 2 MG: 1 INJECTION INTRAMUSCULAR; INTRAVENOUS at 12:20

## 2024-06-04 RX ADMIN — FENTANYL CITRATE 50 MCG: 50 INJECTION, SOLUTION INTRAMUSCULAR; INTRAVENOUS at 13:30

## 2024-06-04 RX ADMIN — Medication 100 MCG: at 14:36

## 2024-06-04 RX ADMIN — SODIUM CHLORIDE, SODIUM LACTATE, POTASSIUM CHLORIDE, AND CALCIUM CHLORIDE: 600; 310; 30; 20 INJECTION, SOLUTION INTRAVENOUS at 13:00

## 2024-06-04 RX ADMIN — ONDANSETRON HYDROCHLORIDE 4 MG: 2 INJECTION INTRAMUSCULAR; INTRAVENOUS at 13:31

## 2024-06-04 RX ADMIN — FENTANYL CITRATE 50 MCG: 50 INJECTION, SOLUTION INTRAMUSCULAR; INTRAVENOUS at 13:01

## 2024-06-04 RX ADMIN — PROPOFOL 50 MG: 10 INJECTION, EMULSION INTRAVENOUS at 13:30

## 2024-06-04 RX ADMIN — GLYCOPYRROLATE 0.2 MG: 0.2 INJECTION INTRAMUSCULAR; INTRAVENOUS at 13:36

## 2024-06-04 RX ADMIN — Medication 100 MCG: at 14:13

## 2024-06-04 RX ADMIN — LIDOCAINE HYDROCHLORIDE 60 MG: 20 INJECTION, SOLUTION EPIDURAL; INFILTRATION; INTRACAUDAL; PERINEURAL at 13:02

## 2024-06-04 RX ADMIN — Medication 50 MCG: at 13:43

## 2024-06-04 ASSESSMENT — PAIN DESCRIPTION - LOCATION
LOCATION: KNEE

## 2024-06-04 ASSESSMENT — PAIN DESCRIPTION - DESCRIPTORS
DESCRIPTORS: ACHING
DESCRIPTORS: ACHING

## 2024-06-04 ASSESSMENT — PAIN DESCRIPTION - ONSET
ONSET: ON-GOING
ONSET: ON-GOING

## 2024-06-04 ASSESSMENT — PAIN - FUNCTIONAL ASSESSMENT
PAIN_FUNCTIONAL_ASSESSMENT: ACTIVITIES ARE NOT PREVENTED
PAIN_FUNCTIONAL_ASSESSMENT: ACTIVITIES ARE NOT PREVENTED

## 2024-06-04 ASSESSMENT — PAIN DESCRIPTION - PAIN TYPE
TYPE: SURGICAL PAIN
TYPE: SURGICAL PAIN

## 2024-06-04 ASSESSMENT — PAIN DESCRIPTION - FREQUENCY
FREQUENCY: CONTINUOUS
FREQUENCY: CONTINUOUS

## 2024-06-04 ASSESSMENT — PAIN DESCRIPTION - ORIENTATION
ORIENTATION: LEFT
ORIENTATION: LEFT

## 2024-06-04 ASSESSMENT — PAIN SCALES - GENERAL
PAINLEVEL_OUTOF10: 8
PAINLEVEL_OUTOF10: 8
PAINLEVEL_OUTOF10: 10

## 2024-06-04 NOTE — ANESTHESIA PRE PROCEDURE
Department of Anesthesiology  Preprocedure Note       Name:  Abhilash Suh   Age:  38 y.o.  :  1985                                          MRN:  [id number]         Date:  2024      Surgeon: Surgeon(s):  Jaydon Hicks DO    Procedure: Procedure(s):  LEFT KNEE PATELLA OPEN REDUCTION INTERNAL FIXATION WITH C-ARM    Medications prior to admission:   Prior to Admission medications    Medication Sig Start Date End Date Taking? Authorizing Provider   naproxen (NAPROSYN) 500 MG tablet Take 1 tablet by mouth 2 times daily 24   Gus Mayers MD   HYDROcodone-acetaminophen (NORCO) 5-325 MG per tablet Take 1 tablet by mouth every 6 hours as needed for Pain. 24   Gus Mayers MD   ibuprofen (ADVIL;MOTRIN) 800 MG tablet Take 1 tablet by mouth every 6 hours as needed for Pain    Gus Mayers MD   vitamin D (ERGOCALCIFEROL) 1.25 MG (57940 UT) CAPS capsule Take 1 capsule by mouth once a week Indications:     Gus Mayers MD   insulin detemir (LEVEMIR) 100 UNIT/ML injection vial Inject 30 Units into the skin nightly    Gus Mayers MD   insulin aspart (NOVOLOG) 100 UNIT/ML injection vial Inject 20 Units into the skin 3 times daily (before meals)    Gus Mayers MD   acetaminophen (TYLENOL) 325 MG tablet Take 2 tablets by mouth every 4 hours as needed 19   Automatic Reconciliation, Ar       Current medications:    Current Facility-Administered Medications   Medication Dose Route Frequency Provider Last Rate Last Admin    lactated ringers IV soln infusion   IntraVENous Continuous Jaydon Hicks  mL/hr at 24 1158 NoRateChange at 24 1158    ceFAZolin (ANCEF) 2,000 mg in sterile water 20 mL IV syringe  2,000 mg IntraVENous On Call to OR Jaydon Hicks DO        tranexamic acid-NaCl IVPB premix 1,000 mg  1,000 mg IntraVENous Once Jaydon Hicks DO        tranexamic acid-NaCl IVPB premix 1,000 mg  1,000 mg

## 2024-06-04 NOTE — ANESTHESIA PROCEDURE NOTES
Peripheral Block    Patient location during procedure: holding area  Reason for block: procedure for pain, post-op pain management and at surgeon's request  Start time: 6/4/2024 12:20 PM  End time: 6/4/2024 12:25 PM  Staffing  Performed: anesthesiologist   Anesthesiologist: Jimmy Nicole MD  Performed by: Jimmy Nicole MD  Authorized by: Jimmy Nicole MD    Preanesthetic Checklist  Completed: patient identified, IV checked, site marked, risks and benefits discussed, surgical/procedural consents, equipment checked, pre-op evaluation, timeout performed, anesthesia consent given, oxygen available, monitors applied/VS acknowledged, fire risk safety assessment completed and verbalized and blood product R/B/A discussed and consented  Peripheral Block   Patient position: supine  Prep: ChloraPrep  Provider prep: sterile gloves and mask  Patient monitoring: cardiac monitor, continuous pulse ox, continuous capnometry, frequent blood pressure checks, IV access, oxygen and responsive to questions  Block type: Femoral  Femoral crease  Laterality: left  Injection technique: single-shot  Guidance: nerve stimulator and ultrasound guided    Needle   Needle type: insulated echogenic nerve stimulator needle   Needle gauge: 21 G  Needle localization: ultrasound guidance and nerve stimulator  Needle insertion depth: 4 cm  Needle length: 8 cm  Assessment   Injection assessment: negative aspiration for heme, no paresthesia on injection, local visualized surrounding nerve on ultrasound and no intravascular symptoms  Paresthesia pain: none  Slow fractionated injection: yes  Hemodynamics: stable  Outcomes: uncomplicated    Medications Administered  mepivacaine (CARBOCAINE) injection 1.5% - Perineural   10 mL - 6/4/2024 12:25:00 PM  ropivacaine (NAROPIN) injection 0.5% - Perineural   20 mL - 6/4/2024 12:25:00 PM

## 2024-06-04 NOTE — INTERVAL H&P NOTE
After Visit Summary   10/20/2017    Daniel Keene    MRN: [id number]           Patient Information     Date Of Birth          2015        Visit Information        Provider Department      10/20/2017 9:00 AM  [facility]        Today's Diagnoses     Need for prophylactic vaccination and inoculation against influenza    -  1       Follow-ups after your visit        Who to contact     If you have questions or need follow up information about today's clinic visit or your schedule please contact [facility] directly at [phone].  Normal or non-critical lab and imaging results will be communicated to you by FatTailhart, letter or phone within 4 business days after the clinic has received the results. If you do not hear from us within 7 days, please contact the clinic through Store Eyest or phone. If you have a critical or abnormal lab result, we will notify you by phone as soon as possible.  Submit refill requests through Panoramic Power or call your pharmacy and they will forward the refill request to us. Please allow 3 business days for your refill to be completed.          Additional Information About Your Visit        MyChart Information     Pan[employer] gives you secure access to your electronic health record. If you see a primary care provider, you can also send messages to your care team and make appointments. If you have questions, please call your primary care clinic.  If you do not have a primary care provider, please call [phone] and they will assist you.        Care EveryWhere ID     This is your Care EveryWhere ID. This could be used by other organizations to access your [facility] medical records  [id number]         Blood Pressure from Last 3 Encounters:   02/13/17 91/56   02/09/14 74/38   02/07/14 86/53    Weight from Last 3 Encounters:   09/13/17 38 lb (17.2 kg) (82 %)*   09/11/17 38 lb (17.2 kg) (82 %)*   07/11/17 39 lb 9.6 oz (18 kg) (92 %)*     * Growth  Update History & Physical    The patient's History and Physical  was reviewed with the patient and I examined the patient. There was no change. The surgical site was confirmed by the patient and me.     Plan: The risks, benefits, expected outcome, and alternative to the recommended procedure have been discussed with the patient. Patient understands and wants to proceed with the procedure.     Electronically signed by Jaydon Hicks DO on 6/4/2024 at 9:44 AM       percentiles are based on [facility] 2-20 Years data.              We Performed the Following     FLU VAC, SPLIT VIRUS IM > 3 YO (QUADRIVALENT) [68519]     Vaccine Administration, Initial [67181]        Primary Care Provider Office Phone # Fax #    Debora Parker [phone] [address] [facility] [address]        Equal Access to Services     [facility]: Hadii aad ku hadasho Soomaali, waaxda luqadaha, qaybta kaalmada adeegyada, waxay idiin hayaan adeeg kharash la'aan . So [facility] [phone].    ATENCIÓN: Si habla español, tiene a wilks disposición servicios gratuitos de asistencia lingüística. Llame al [phone].    We comply with applicable federal civil rights laws and [place] laws. We do not discriminate on the basis of race, color, national origin, age, disability, sex, sexual orientation, or gender identity.            Thank you!     Thank you for choosing [facility]  for your care. Our goal is always to provide you with excellent care. Hearing back from our patients is one way we can continue to improve our services. Please take a few minutes to complete the written survey that you may receive in the mail after your visit with us. Thank you!             Your Updated Medication List - Protect others around you: Learn how to safely use, store and throw away your medicines at www.disposemymeds.org.          This list is accurate as of: 10/20/17  9:01 AM.  Always use your most recent med list.                   Brand Name Dispense Instructions for use Diagnosis    acetaminophen 32 mg/mL solution    TYLENOL    150 mL    Take 5 mLs (160 mg) by mouth every 6 hours as needed for fever or mild pain    Viral syndrome       atropine 1 % ophthalmic solution     1 Bottle    Place 1 drop Into the left eye every other day    Strabismic amblyopia of right eye       azithromycin 200 MG/5ML suspension    ZITHROMAX    15 mL    Shake well and give 5 ml day 1, and 2.5 ml days 2-5     Respiratory illness with fever       hydrocortisone 2.5 % cream     30 g    Apply topically 2 times daily    Bug bites, initial encounter, Ear swelling, left       ibuprofen 100 MG/5ML suspension    CHILD IBUPROFEN    100 mL    Take 6 mLs (120 mg) by mouth every 6 hours as needed for fever or pain    Viral syndrome

## 2024-06-04 NOTE — BRIEF OP NOTE
Brief Postoperative Note      Patient: Abhilash Suh  YOB: 1985  MRN: [id number]    Date of Procedure: 6/4/2024    Pre-Op Diagnosis Codes:     * Closed nondisplaced fracture of left patella, unspecified fracture morphology, initial encounter [S82.002A]    Post-Op Diagnosis: Same       Procedure(s):  LEFT KNEE PATELLA OPEN REDUCTION INTERNAL FIXATION WITH C-ARM    Surgeon(s):  Jaydon Hicks DO    Assistant:  * No surgical staff found *    Anesthesia: General    Estimated Blood Loss (mL): Minimal    Complications: None    Specimens:   * No specimens in log *    Implants:  * No implants in log *      Drains: * No LDAs found *    Findings:  Infection Present At Time Of Surgery (PATOS) (choose all levels that have infection present):  No infection present  Other Findings: CTS and trigger finger index    Electronically signed by Jyadon Hicks DO on 6/4/2024 at 12:49 PM

## 2024-06-04 NOTE — PERIOP NOTE
TRANSFER - OUT REPORT:    Verbal report given to Miri MARION on Abhilash Suh  being transferred to Phase 2 for routine progression of patient care       Report consisted of patient's Situation, Background, Assessment and   Recommendations(SBAR).     Information from the following report(s) Adult Overview, Surgery Report, Intake/Output, and MAR was reviewed with the receiving nurse.           Lines:   Peripheral IV 06/04/24 Right Wrist (Active)   Site Assessment Clean, dry & intact 06/04/24 1531   Line Status Infusing 06/04/24 1531   Phlebitis Assessment No symptoms 06/04/24 1531   Infiltration Assessment 0 06/04/24 1531   Dressing Status Clean, dry & intact 06/04/24 1531   Dressing Type Transparent 06/04/24 1531        Opportunity for questions and clarification was provided.      Patient transported with:  Registered Nurse

## 2024-06-04 NOTE — PERIOP NOTE
TRANSFER - IN REPORT:    Verbal report received from OR RN on Abhilash Suh  being received from OR for routine progression of patient care      Report consisted of patient's Situation, Background, Assessment and   Recommendations(SBAR).     Information from the following report(s) Adult Overview, Surgery Report, Intake/Output, and MAR   was reviewed with the receiving nurse.    Opportunity for questions and clarification was provided.      Assessment completed upon patient's arrival to unit and care assumed.

## 2024-06-04 NOTE — BRIEF OP NOTE
Brief Postoperative Note      Patient: Abhilash Suh  YOB: 1985  MRN: [id number]    Date of Procedure: 6/4/2024    Pre-Op Diagnosis Codes:     * Closed nondisplaced fracture of left patella, unspecified fracture morphology, initial encounter [S82.002A]    Post-Op Diagnosis: Same       Procedure(s):  LEFT KNEE PATELLA OPEN REDUCTION INTERNAL FIXATION WITH C-ARM    Surgeon(s):  Jaydon Hicks DO    Assistant:  Surgical Assistant: James Murcia  Physician Assistant: Geetha Cortez PA-C    Anesthesia: General    Estimated Blood Loss (mL): less than 50     Complications: None    Specimens:   * No specimens in log *    Implants:  Implant Name Type Inv. Item Serial No.  Lot No. LRB No. Used Action   SCREW BNE L36MM DIA4MM NONSTERILE CAROLINE CANC HIP S STL ST - [id number]  SCREW BNE L36MM DIA4MM NONSTERILE CAROLINE CANC HIP S STL ST  [employer] [id number]  Left 2 Implanted   18G Wire     [id number] Left 1 Implanted         Drains: * No LDAs found *    Findings:  Infection Present At Time Of Surgery (PATOS) (choose all levels that have infection present):  No infection present  Other Findings: comminuted displaced patella fracture     Electronically signed by Jaydon Hicks DO on 6/4/2024 at 2:56 PM

## 2024-06-05 NOTE — ANESTHESIA POSTPROCEDURE EVALUATION
.Post-Anesthesia Evaluation & Assessment    Visit Vitals  /85   Pulse (!) 108   Temp 98 °F (36.7 °C) (Oral)   Resp 14   Ht 1.6 m (5' 3\")   Wt 57.7 kg (127 lb 1.6 oz)   SpO2 98%   BMI 22.51 kg/m²       Nausea/Vomiting: no nausea    Post-operative hydration adequate.  Pain score (VAS): 0    Mental status & Level of consciousness: alert and oriented x 3    Neurological status: moves all extremities, sensation grossly intact    Pulmonary status: airway patent, no supplemental oxygen required    Complications related to anesthesia: none    Additional comments:

## 2024-06-05 NOTE — OP NOTE
Soft tissue dissection noted to complete disruption of the retinaculum medial and lateral as well as inferior pole comminution.  At this point, we maintained reduction with a bone reduction tenaculum.  The lateral margin of the articular piece that was fragmented was stabilized with FiberWire with cerclage.  Following that, we utilized a cannulated screw fixation, placement of guidewires, overdrilled, and then measured to a depth of 36 mm into the best overall fit from the superior pole to the inferior pole.  With the maintained reduction, we replaced the screws without difficulty maintaining the reduction.  We then utilized an 18-gauge wire, introduced through the cannulas over the screw in a figure-of-eight pattern about the dorsal aspect to increase the dorsal compression to allow for again healing.  This was appropriately tensioned in the superior corners sequentially, medial and lateral.  Once appropriately tensioned, on evaluation flexion to 100 degrees without separation of the fracture, the remaining portions of the tails were cut and then under folded into the soft tissues as to not disrupt the skin.  At this point, we continued with copious irrigation with dilute Betadine solution followed by saline solution.  Reapproximation of the medial retinaculum was then done with #2 FiberWire as well as the lateral portions.  A small portion of the inferior portion of patellar tendon was then repaired as well from the process of placing the cannulated screws.  The soft tissues were then reapproximated with #1 and then 2-0 Vicryl followed by Prineo at skin.  She will maintain limited weightbearing activity, flexion, maintaining knee immobilizer for any weightbearing transition.  We will continue appropriate pain control and  additional evaluation with followup x-rays.        KELVIN CHARLES DO      JSUSAN/AQS  D:  06/04/2024 15:01:28  T:  06/05/2024 03:09:58  JOB #:  [id number]

## 2024-06-11 ENCOUNTER — HOSPITAL ENCOUNTER (INPATIENT)
Facility: HOSPITAL | Age: 39
LOS: 8 days | Discharge: NURSING, REHAB OR LONG-TERM CARE | DRG: 420 | End: 2024-06-19
Attending: EMERGENCY MEDICINE | Admitting: INTERNAL MEDICINE
Payer: MEDICAID

## 2024-06-11 ENCOUNTER — APPOINTMENT (OUTPATIENT)
Facility: HOSPITAL | Age: 39
DRG: 420 | End: 2024-06-11
Payer: MEDICAID

## 2024-06-11 DIAGNOSIS — E87.20 METABOLIC ACIDOSIS: Primary | ICD-10-CM

## 2024-06-11 DIAGNOSIS — Z98.890 STATUS POST SURGERY: ICD-10-CM

## 2024-06-11 DIAGNOSIS — R11.2 NAUSEA AND VOMITING, UNSPECIFIED VOMITING TYPE: ICD-10-CM

## 2024-06-11 DIAGNOSIS — E87.29 STARVATION KETOACIDOSIS: ICD-10-CM

## 2024-06-11 DIAGNOSIS — T73.0XXA STARVATION KETOACIDOSIS: ICD-10-CM

## 2024-06-11 PROBLEM — K59.00 CONSTIPATION: Status: ACTIVE | Noted: 2024-06-11

## 2024-06-11 LAB
ALBUMIN SERPL-MCNC: 3 G/DL (ref 3.4–5)
ALBUMIN/GLOB SERPL: 0.8 (ref 0.8–1.7)
ALP SERPL-CCNC: 94 U/L (ref 45–117)
ALT SERPL-CCNC: 14 U/L (ref 13–56)
ANION GAP SERPL CALC-SCNC: 13 MMOL/L (ref 3–18)
ANION GAP SERPL CALC-SCNC: 9 MMOL/L (ref 3–18)
APPEARANCE UR: ABNORMAL
AST SERPL-CCNC: 12 U/L (ref 10–38)
BACTERIA URNS QL MICRO: ABNORMAL /HPF
BASE DEFICIT BLD-SCNC: 13.6 MMOL/L
BASOPHILS # BLD: 0 K/UL (ref 0–0.1)
BASOPHILS NFR BLD: 1 % (ref 0–2)
BILIRUB SERPL-MCNC: 0.5 MG/DL (ref 0.2–1)
BILIRUB UR QL: NEGATIVE
BUN SERPL-MCNC: 14 MG/DL (ref 7–18)
BUN SERPL-MCNC: 19 MG/DL (ref 7–18)
BUN/CREAT SERPL: 19 (ref 12–20)
BUN/CREAT SERPL: 23 (ref 12–20)
CALCIUM SERPL-MCNC: 8.5 MG/DL (ref 8.5–10.1)
CALCIUM SERPL-MCNC: 8.6 MG/DL (ref 8.5–10.1)
CHLORIDE SERPL-SCNC: 109 MMOL/L (ref 100–111)
CHLORIDE SERPL-SCNC: 114 MMOL/L (ref 100–111)
CO2 SERPL-SCNC: 15 MMOL/L (ref 21–32)
CO2 SERPL-SCNC: 17 MMOL/L (ref 21–32)
COLOR UR: YELLOW
CREAT SERPL-MCNC: 0.74 MG/DL (ref 0.6–1.3)
CREAT SERPL-MCNC: 0.82 MG/DL (ref 0.6–1.3)
DIFFERENTIAL METHOD BLD: ABNORMAL
EOSINOPHIL # BLD: 0 K/UL (ref 0–0.4)
EOSINOPHIL NFR BLD: 1 % (ref 0–5)
EPITH CASTS URNS QL MICRO: NEGATIVE /LPF (ref 0–5)
ERYTHROCYTE [DISTWIDTH] IN BLOOD BY AUTOMATED COUNT: 11.9 % (ref 11.6–14.5)
GLOBULIN SER CALC-MCNC: 4 G/DL (ref 2–4)
GLUCOSE BLD STRIP.AUTO-MCNC: 182 MG/DL (ref 70–110)
GLUCOSE BLD STRIP.AUTO-MCNC: 188 MG/DL (ref 70–110)
GLUCOSE BLD STRIP.AUTO-MCNC: 190 MG/DL (ref 70–110)
GLUCOSE BLD STRIP.AUTO-MCNC: 264 MG/DL (ref 70–110)
GLUCOSE BLD STRIP.AUTO-MCNC: 283 MG/DL (ref 70–110)
GLUCOSE BLD STRIP.AUTO-MCNC: 306 MG/DL (ref 70–110)
GLUCOSE SERPL-MCNC: 191 MG/DL (ref 74–99)
GLUCOSE SERPL-MCNC: 268 MG/DL (ref 74–99)
GLUCOSE UR STRIP.AUTO-MCNC: >1000 MG/DL
HCO3 BLD-SCNC: 12.3 MMOL/L (ref 22–26)
HCT VFR BLD AUTO: 37.5 % (ref 35–45)
HGB BLD-MCNC: 12.2 G/DL (ref 12–16)
HGB UR QL STRIP: ABNORMAL
IMM GRANULOCYTES # BLD AUTO: 0 K/UL (ref 0–0.04)
IMM GRANULOCYTES NFR BLD AUTO: 1 % (ref 0–0.5)
KETONES UR QL STRIP.AUTO: >160 MG/DL
LACTATE BLD-SCNC: 1.17 MMOL/L (ref 0.4–2)
LEUKOCYTE ESTERASE UR QL STRIP.AUTO: ABNORMAL
LIPASE SERPL-CCNC: 14 U/L (ref 13–75)
LYMPHOCYTES # BLD: 1.4 K/UL (ref 0.9–3.6)
LYMPHOCYTES NFR BLD: 36 % (ref 21–52)
MAGNESIUM SERPL-MCNC: 2 MG/DL (ref 1.6–2.6)
MAGNESIUM SERPL-MCNC: 2.1 MG/DL (ref 1.6–2.6)
MCH RBC QN AUTO: 29.7 PG (ref 24–34)
MCHC RBC AUTO-ENTMCNC: 32.5 G/DL (ref 31–37)
MCV RBC AUTO: 91.2 FL (ref 78–100)
MONOCYTES # BLD: 0.5 K/UL (ref 0.05–1.2)
MONOCYTES NFR BLD: 12 % (ref 3–10)
NEUTS SEG # BLD: 2 K/UL (ref 1.8–8)
NEUTS SEG NFR BLD: 51 % (ref 40–73)
NITRITE UR QL STRIP.AUTO: NEGATIVE
NRBC # BLD: 0 K/UL (ref 0–0.01)
NRBC BLD-RTO: 0 PER 100 WBC
NT PRO BNP: 16 PG/ML (ref 0–450)
PCO2 BLD: 28.7 MMHG (ref 35–45)
PH BLD: 7.24 (ref 7.35–7.45)
PH UR STRIP: 5.5 (ref 5–8)
PHOSPHATE SERPL-MCNC: 2 MG/DL (ref 2.5–4.9)
PLATELET # BLD AUTO: 448 K/UL (ref 135–420)
PMV BLD AUTO: 9 FL (ref 9.2–11.8)
PO2 BLD: 23 MMHG (ref 80–100)
POTASSIUM SERPL-SCNC: 3.8 MMOL/L (ref 3.5–5.5)
POTASSIUM SERPL-SCNC: 4.5 MMOL/L (ref 3.5–5.5)
PROT SERPL-MCNC: 7 G/DL (ref 6.4–8.2)
PROT UR STRIP-MCNC: 30 MG/DL
RBC # BLD AUTO: 4.11 M/UL (ref 4.2–5.3)
RBC #/AREA URNS HPF: ABNORMAL /HPF (ref 0–5)
SAO2 % BLD: 32.3 % (ref 92–97)
SERVICE CMNT-IMP: ABNORMAL
SODIUM SERPL-SCNC: 137 MMOL/L (ref 136–145)
SODIUM SERPL-SCNC: 140 MMOL/L (ref 136–145)
SP GR UR REFRACTOMETRY: >1.03 (ref 1–1.03)
SPECIMEN TYPE: ABNORMAL
TROPONIN I SERPL HS-MCNC: 4 NG/L (ref 0–54)
UROBILINOGEN UR QL STRIP.AUTO: 0.2 EU/DL (ref 0.2–1)
WBC # BLD AUTO: 3.9 K/UL (ref 4.6–13.2)
WBC CASTS URNS QL MICRO: ABNORMAL /LPF
WBC URNS QL MICRO: ABNORMAL /HPF (ref 0–5)
YEAST URNS QL MICRO: ABNORMAL

## 2024-06-11 PROCEDURE — 99285 EMERGENCY DEPT VISIT HI MDM: CPT

## 2024-06-11 PROCEDURE — 96374 THER/PROPH/DIAG INJ IV PUSH: CPT

## 2024-06-11 PROCEDURE — 83735 ASSAY OF MAGNESIUM: CPT

## 2024-06-11 PROCEDURE — 96365 THER/PROPH/DIAG IV INF INIT: CPT

## 2024-06-11 PROCEDURE — 83880 ASSAY OF NATRIURETIC PEPTIDE: CPT

## 2024-06-11 PROCEDURE — 80053 COMPREHEN METABOLIC PANEL: CPT

## 2024-06-11 PROCEDURE — 96375 TX/PRO/DX INJ NEW DRUG ADDON: CPT

## 2024-06-11 PROCEDURE — 2580000003 HC RX 258: Performed by: EMERGENCY MEDICINE

## 2024-06-11 PROCEDURE — 96366 THER/PROPH/DIAG IV INF ADDON: CPT

## 2024-06-11 PROCEDURE — 83605 ASSAY OF LACTIC ACID: CPT

## 2024-06-11 PROCEDURE — 82962 GLUCOSE BLOOD TEST: CPT

## 2024-06-11 PROCEDURE — 82803 BLOOD GASES ANY COMBINATION: CPT

## 2024-06-11 PROCEDURE — 6360000002 HC RX W HCPCS: Performed by: EMERGENCY MEDICINE

## 2024-06-11 PROCEDURE — 2500000003 HC RX 250 WO HCPCS: Performed by: INTERNAL MEDICINE

## 2024-06-11 PROCEDURE — 6370000000 HC RX 637 (ALT 250 FOR IP): Performed by: INTERNAL MEDICINE

## 2024-06-11 PROCEDURE — 71275 CT ANGIOGRAPHY CHEST: CPT

## 2024-06-11 PROCEDURE — 96376 TX/PRO/DX INJ SAME DRUG ADON: CPT

## 2024-06-11 PROCEDURE — 2580000003 HC RX 258: Performed by: INTERNAL MEDICINE

## 2024-06-11 PROCEDURE — 84484 ASSAY OF TROPONIN QUANT: CPT

## 2024-06-11 PROCEDURE — 87086 URINE CULTURE/COLONY COUNT: CPT

## 2024-06-11 PROCEDURE — 85025 COMPLETE CBC W/AUTO DIFF WBC: CPT

## 2024-06-11 PROCEDURE — 6370000000 HC RX 637 (ALT 250 FOR IP): Performed by: EMERGENCY MEDICINE

## 2024-06-11 PROCEDURE — 83690 ASSAY OF LIPASE: CPT

## 2024-06-11 PROCEDURE — 84100 ASSAY OF PHOSPHORUS: CPT

## 2024-06-11 PROCEDURE — 2000000000 HC ICU R&B

## 2024-06-11 PROCEDURE — 83036 HEMOGLOBIN GLYCOSYLATED A1C: CPT

## 2024-06-11 PROCEDURE — 74177 CT ABD & PELVIS W/CONTRAST: CPT

## 2024-06-11 PROCEDURE — 6360000004 HC RX CONTRAST MEDICATION: Performed by: EMERGENCY MEDICINE

## 2024-06-11 PROCEDURE — 81001 URINALYSIS AUTO W/SCOPE: CPT

## 2024-06-11 RX ORDER — FLUCONAZOLE 2 MG/ML
200 INJECTION, SOLUTION INTRAVENOUS
Status: COMPLETED | OUTPATIENT
Start: 2024-06-11 | End: 2024-06-11

## 2024-06-11 RX ORDER — POLYETHYLENE GLYCOL 3350 17 G/17G
17 POWDER, FOR SOLUTION ORAL DAILY PRN
Status: DISCONTINUED | OUTPATIENT
Start: 2024-06-11 | End: 2024-06-19 | Stop reason: HOSPADM

## 2024-06-11 RX ORDER — DEXTROSE MONOHYDRATE AND SODIUM CHLORIDE 5; .9 G/100ML; G/100ML
INJECTION, SOLUTION INTRAVENOUS CONTINUOUS
Status: DISCONTINUED | OUTPATIENT
Start: 2024-06-11 | End: 2024-06-11

## 2024-06-11 RX ORDER — DOCUSATE SODIUM 100 MG/1
200 CAPSULE, LIQUID FILLED ORAL DAILY
Status: DISCONTINUED | OUTPATIENT
Start: 2024-06-11 | End: 2024-06-19 | Stop reason: HOSPADM

## 2024-06-11 RX ORDER — ONDANSETRON 4 MG/1
4 TABLET, ORALLY DISINTEGRATING ORAL EVERY 8 HOURS PRN
Status: DISCONTINUED | OUTPATIENT
Start: 2024-06-11 | End: 2024-06-19 | Stop reason: HOSPADM

## 2024-06-11 RX ORDER — SODIUM CHLORIDE 0.9 % (FLUSH) 0.9 %
5-40 SYRINGE (ML) INJECTION EVERY 12 HOURS SCHEDULED
Status: DISCONTINUED | OUTPATIENT
Start: 2024-06-11 | End: 2024-06-19 | Stop reason: HOSPADM

## 2024-06-11 RX ORDER — POTASSIUM CHLORIDE 7.45 MG/ML
10 INJECTION INTRAVENOUS PRN
Status: DISCONTINUED | OUTPATIENT
Start: 2024-06-11 | End: 2024-06-17 | Stop reason: SDUPTHER

## 2024-06-11 RX ORDER — DEXTROSE MONOHYDRATE AND SODIUM CHLORIDE 5; .45 G/100ML; G/100ML
INJECTION, SOLUTION INTRAVENOUS CONTINUOUS PRN
Status: DISCONTINUED | OUTPATIENT
Start: 2024-06-11 | End: 2024-06-19 | Stop reason: HOSPADM

## 2024-06-11 RX ORDER — HYDROCODONE BITARTRATE AND ACETAMINOPHEN 5; 325 MG/1; MG/1
1 TABLET ORAL EVERY 6 HOURS PRN
Status: DISCONTINUED | OUTPATIENT
Start: 2024-06-11 | End: 2024-06-19 | Stop reason: HOSPADM

## 2024-06-11 RX ORDER — MAGNESIUM SULFATE IN WATER 40 MG/ML
2000 INJECTION, SOLUTION INTRAVENOUS PRN
Status: DISCONTINUED | OUTPATIENT
Start: 2024-06-11 | End: 2024-06-19 | Stop reason: HOSPADM

## 2024-06-11 RX ORDER — BISACODYL 5 MG/1
10 TABLET, DELAYED RELEASE ORAL DAILY PRN
Status: DISCONTINUED | OUTPATIENT
Start: 2024-06-11 | End: 2024-06-19 | Stop reason: HOSPADM

## 2024-06-11 RX ORDER — ACETAMINOPHEN 325 MG/1
650 TABLET ORAL EVERY 6 HOURS PRN
Status: DISCONTINUED | OUTPATIENT
Start: 2024-06-11 | End: 2024-06-19 | Stop reason: HOSPADM

## 2024-06-11 RX ORDER — POTASSIUM CHLORIDE 7.45 MG/ML
10 INJECTION INTRAVENOUS PRN
Status: DISCONTINUED | OUTPATIENT
Start: 2024-06-11 | End: 2024-06-19 | Stop reason: HOSPADM

## 2024-06-11 RX ORDER — SODIUM CHLORIDE 9 MG/ML
INJECTION, SOLUTION INTRAVENOUS PRN
Status: DISCONTINUED | OUTPATIENT
Start: 2024-06-11 | End: 2024-06-19 | Stop reason: HOSPADM

## 2024-06-11 RX ORDER — POLYETHYLENE GLYCOL 3350 17 G/17G
17 POWDER, FOR SOLUTION ORAL DAILY
Status: DISCONTINUED | OUTPATIENT
Start: 2024-06-11 | End: 2024-06-17

## 2024-06-11 RX ORDER — ONDANSETRON 2 MG/ML
4 INJECTION INTRAMUSCULAR; INTRAVENOUS
Status: COMPLETED | OUTPATIENT
Start: 2024-06-11 | End: 2024-06-11

## 2024-06-11 RX ORDER — DEXTROSE MONOHYDRATE, SODIUM CHLORIDE, AND POTASSIUM CHLORIDE 50; 1.49; 9 G/1000ML; G/1000ML; G/1000ML
INJECTION, SOLUTION INTRAVENOUS CONTINUOUS
Status: DISCONTINUED | OUTPATIENT
Start: 2024-06-11 | End: 2024-06-12

## 2024-06-11 RX ORDER — SODIUM CHLORIDE 9 MG/ML
INJECTION, SOLUTION INTRAVENOUS CONTINUOUS
Status: DISCONTINUED | OUTPATIENT
Start: 2024-06-11 | End: 2024-06-11

## 2024-06-11 RX ORDER — ACETAMINOPHEN 325 MG/1
650 TABLET ORAL EVERY 4 HOURS PRN
Status: DISCONTINUED | OUTPATIENT
Start: 2024-06-11 | End: 2024-06-19 | Stop reason: HOSPADM

## 2024-06-11 RX ORDER — OXYCODONE HYDROCHLORIDE AND ACETAMINOPHEN 5; 325 MG/1; MG/1
1 TABLET ORAL EVERY 4 HOURS PRN
Status: DISCONTINUED | OUTPATIENT
Start: 2024-06-11 | End: 2024-06-19 | Stop reason: HOSPADM

## 2024-06-11 RX ORDER — ONDANSETRON 2 MG/ML
4 INJECTION INTRAMUSCULAR; INTRAVENOUS EVERY 6 HOURS PRN
Status: DISCONTINUED | OUTPATIENT
Start: 2024-06-11 | End: 2024-06-19 | Stop reason: HOSPADM

## 2024-06-11 RX ORDER — ONDANSETRON 2 MG/ML
4 INJECTION INTRAMUSCULAR; INTRAVENOUS ONCE
Status: COMPLETED | OUTPATIENT
Start: 2024-06-11 | End: 2024-06-11

## 2024-06-11 RX ORDER — MAGNESIUM SULFATE IN WATER 40 MG/ML
2000 INJECTION, SOLUTION INTRAVENOUS PRN
Status: DISCONTINUED | OUTPATIENT
Start: 2024-06-11 | End: 2024-06-17 | Stop reason: SDUPTHER

## 2024-06-11 RX ORDER — ASPIRIN 81 MG/1
81 TABLET, CHEWABLE ORAL EVERY 12 HOURS
Status: DISCONTINUED | OUTPATIENT
Start: 2024-06-11 | End: 2024-06-19 | Stop reason: HOSPADM

## 2024-06-11 RX ORDER — SODIUM CHLORIDE 0.9 % (FLUSH) 0.9 %
5-40 SYRINGE (ML) INJECTION PRN
Status: DISCONTINUED | OUTPATIENT
Start: 2024-06-11 | End: 2024-06-19 | Stop reason: HOSPADM

## 2024-06-11 RX ORDER — SODIUM CHLORIDE, SODIUM LACTATE, POTASSIUM CHLORIDE, AND CALCIUM CHLORIDE .6; .31; .03; .02 G/100ML; G/100ML; G/100ML; G/100ML
1000 INJECTION, SOLUTION INTRAVENOUS ONCE
Status: COMPLETED | OUTPATIENT
Start: 2024-06-11 | End: 2024-06-11

## 2024-06-11 RX ORDER — ACETAMINOPHEN 650 MG/1
650 SUPPOSITORY RECTAL EVERY 6 HOURS PRN
Status: DISCONTINUED | OUTPATIENT
Start: 2024-06-11 | End: 2024-06-19 | Stop reason: HOSPADM

## 2024-06-11 RX ORDER — 0.9 % SODIUM CHLORIDE 0.9 %
1000 INTRAVENOUS SOLUTION INTRAVENOUS ONCE
Status: COMPLETED | OUTPATIENT
Start: 2024-06-11 | End: 2024-06-11

## 2024-06-11 RX ORDER — POTASSIUM CHLORIDE 20 MEQ/1
40 TABLET, EXTENDED RELEASE ORAL PRN
Status: DISCONTINUED | OUTPATIENT
Start: 2024-06-11 | End: 2024-06-19 | Stop reason: HOSPADM

## 2024-06-11 RX ORDER — ENOXAPARIN SODIUM 100 MG/ML
40 INJECTION SUBCUTANEOUS DAILY
Status: DISCONTINUED | OUTPATIENT
Start: 2024-06-11 | End: 2024-06-11

## 2024-06-11 RX ADMIN — ONDANSETRON 4 MG: 2 INJECTION INTRAMUSCULAR; INTRAVENOUS at 19:06

## 2024-06-11 RX ADMIN — IOPAMIDOL 100 ML: 755 INJECTION, SOLUTION INTRAVENOUS at 15:31

## 2024-06-11 RX ADMIN — DEXTROSE AND SODIUM CHLORIDE: 5; 450 INJECTION, SOLUTION INTRAVENOUS at 18:13

## 2024-06-11 RX ADMIN — SODIUM CHLORIDE, POTASSIUM CHLORIDE, SODIUM LACTATE AND CALCIUM CHLORIDE 1000 ML: 600; 310; 30; 20 INJECTION, SOLUTION INTRAVENOUS at 15:59

## 2024-06-11 RX ADMIN — DOCUSATE SODIUM 200 MG: 100 CAPSULE, LIQUID FILLED ORAL at 21:09

## 2024-06-11 RX ADMIN — SODIUM CHLORIDE, PRESERVATIVE FREE 10 ML: 5 INJECTION INTRAVENOUS at 21:09

## 2024-06-11 RX ADMIN — SODIUM CHLORIDE 4.5 UNITS/HR: 9 INJECTION, SOLUTION INTRAVENOUS at 18:01

## 2024-06-11 RX ADMIN — Medication 12.5 MG: at 16:34

## 2024-06-11 RX ADMIN — SODIUM CHLORIDE 1000 ML: 9 INJECTION, SOLUTION INTRAVENOUS at 14:11

## 2024-06-11 RX ADMIN — WATER 2000 MG: 1 INJECTION INTRAMUSCULAR; INTRAVENOUS; SUBCUTANEOUS at 19:06

## 2024-06-11 RX ADMIN — SODIUM CHLORIDE 6.1 UNITS/HR: 9 INJECTION, SOLUTION INTRAVENOUS at 19:21

## 2024-06-11 RX ADMIN — FLUCONAZOLE 200 MG: 200 INJECTION, SOLUTION INTRAVENOUS at 20:38

## 2024-06-11 RX ADMIN — ONDANSETRON 4 MG: 2 INJECTION INTRAMUSCULAR; INTRAVENOUS at 14:10

## 2024-06-11 RX ADMIN — ASPIRIN 81 MG: 81 TABLET, CHEWABLE ORAL at 21:09

## 2024-06-11 RX ADMIN — POLYETHYLENE GLYCOL 3350 17 G: 17 POWDER, FOR SOLUTION ORAL at 21:09

## 2024-06-11 RX ADMIN — POTASSIUM CHLORIDE, DEXTROSE MONOHYDRATE AND SODIUM CHLORIDE: 150; 5; 900 INJECTION, SOLUTION INTRAVENOUS at 22:50

## 2024-06-11 ASSESSMENT — PAIN SCALES - GENERAL: PAINLEVEL_OUTOF10: 0

## 2024-06-11 NOTE — ED TRIAGE NOTES
Pt arrived via EMS. C/C N/V and constipation. Pt had knee surgery on last Tuesday 6/4. Pt able to eat a little bit but issues keeping things down. Pt thinks last bowel movement was the Saturday prior to surgery.         Pt able to ambulate on her own at home.

## 2024-06-11 NOTE — ED PROVIDER NOTES
hospitalist return call for admission. [ELIZABETH]      ED Course User Index  [JM] Winston Self MD  [ELIZABETH] Santo Vela, DO       None    Medical Decision Making     SCREENING TOOLS:  None    CLINICAL MANAGEMENT TOOLS:  Not Applicable    Is this patient to be included in the SEP-1 core measure? No Exclusion criteria - the patient is NOT to be included for SEP-1 Core Measure due to: 2+ SIRS criteria are not met    DDX: Postop nausea, postop anorexia, dehydration, PE    DISCUSSION:  This appears to be a moderate condition.  This appears to be an acute condition.    38 y.o. female with tachycardia, decreased oral intake, constipation since her surgery last week.  In evaluation of the above differential diagnosis, consideration was given to the following tests and treatments.  Patient presenting tachycardic which is likely due to poor p.o. intake especially fluids over the last week.  IV fluids were started in the ED.  Although patient also has exertional dyspnea so will CTA for PE.  Due to lack of bowel movement over the last 9 days, will also CT scan abdomen and pelvis.  The decision to perform testing and results were discussed with the patient. Results of testing and disposition are pending at the time of sign out. Please see the accepting physician's note from the same day for amplifying details surrounding disposition.      ADDITIONAL CONSIDERATIONS:  None    SMOKING CESSATION:   None    Critical Care Time:     I have independently provided 66 minutes of non-concurrent critical care time involved in lab review, consultations with specialist, family decision-making, and documentation. This time does not include time spent on separately billable procedures.  During this entire length of time I was immediately available to the patient.    Critical Care:  The reason for providing this level of medical care for this critically ill patient was due a critical illness that impaired one or more vital organ systems such that

## 2024-06-12 ENCOUNTER — APPOINTMENT (OUTPATIENT)
Facility: HOSPITAL | Age: 39
DRG: 420 | End: 2024-06-12
Payer: MEDICAID

## 2024-06-12 PROBLEM — N39.0 UTI (URINARY TRACT INFECTION): Status: ACTIVE | Noted: 2024-06-12

## 2024-06-12 PROBLEM — E10.10 DIABETIC KETOACIDOSIS WITHOUT COMA ASSOCIATED WITH TYPE 1 DIABETES MELLITUS (HCC): Status: ACTIVE | Noted: 2024-06-12

## 2024-06-12 LAB
ALBUMIN SERPL-MCNC: 2.4 G/DL (ref 3.4–5)
ANION GAP SERPL CALC-SCNC: 4 MMOL/L (ref 3–18)
ANION GAP SERPL CALC-SCNC: 8 MMOL/L (ref 3–18)
BUN SERPL-MCNC: 12 MG/DL (ref 7–18)
BUN SERPL-MCNC: 8 MG/DL (ref 7–18)
BUN/CREAT SERPL: 16 (ref 12–20)
BUN/CREAT SERPL: 17 (ref 12–20)
CALCIUM SERPL-MCNC: 8.1 MG/DL (ref 8.5–10.1)
CALCIUM SERPL-MCNC: 8.1 MG/DL (ref 8.5–10.1)
CHLORIDE SERPL-SCNC: 115 MMOL/L (ref 100–111)
CHLORIDE SERPL-SCNC: 117 MMOL/L (ref 100–111)
CO2 SERPL-SCNC: 19 MMOL/L (ref 21–32)
CO2 SERPL-SCNC: 21 MMOL/L (ref 21–32)
CREAT SERPL-MCNC: 0.51 MG/DL (ref 0.6–1.3)
CREAT SERPL-MCNC: 0.69 MG/DL (ref 0.6–1.3)
EST. AVERAGE GLUCOSE BLD GHB EST-MCNC: 223 MG/DL
GLUCOSE BLD STRIP.AUTO-MCNC: 100 MG/DL (ref 70–110)
GLUCOSE BLD STRIP.AUTO-MCNC: 143 MG/DL (ref 70–110)
GLUCOSE BLD STRIP.AUTO-MCNC: 169 MG/DL (ref 70–110)
GLUCOSE BLD STRIP.AUTO-MCNC: 173 MG/DL (ref 70–110)
GLUCOSE BLD STRIP.AUTO-MCNC: 174 MG/DL (ref 70–110)
GLUCOSE BLD STRIP.AUTO-MCNC: 198 MG/DL (ref 70–110)
GLUCOSE BLD STRIP.AUTO-MCNC: 205 MG/DL (ref 70–110)
GLUCOSE BLD STRIP.AUTO-MCNC: 213 MG/DL (ref 70–110)
GLUCOSE BLD STRIP.AUTO-MCNC: 215 MG/DL (ref 70–110)
GLUCOSE BLD STRIP.AUTO-MCNC: 244 MG/DL (ref 70–110)
GLUCOSE BLD STRIP.AUTO-MCNC: 247 MG/DL (ref 70–110)
GLUCOSE SERPL-MCNC: 172 MG/DL (ref 74–99)
GLUCOSE SERPL-MCNC: 262 MG/DL (ref 74–99)
HBA1C MFR BLD: 9.4 % (ref 4.2–5.6)
MAGNESIUM SERPL-MCNC: 1.9 MG/DL (ref 1.6–2.6)
PHOSPHATE SERPL-MCNC: 1.5 MG/DL (ref 2.5–4.9)
PHOSPHATE SERPL-MCNC: 1.9 MG/DL (ref 2.5–4.9)
POTASSIUM SERPL-SCNC: 3.6 MMOL/L (ref 3.5–5.5)
POTASSIUM SERPL-SCNC: 3.6 MMOL/L (ref 3.5–5.5)
SODIUM SERPL-SCNC: 142 MMOL/L (ref 136–145)
SODIUM SERPL-SCNC: 142 MMOL/L (ref 136–145)

## 2024-06-12 PROCEDURE — 83735 ASSAY OF MAGNESIUM: CPT

## 2024-06-12 PROCEDURE — 36415 COLL VENOUS BLD VENIPUNCTURE: CPT

## 2024-06-12 PROCEDURE — 2580000003 HC RX 258: Performed by: INTERNAL MEDICINE

## 2024-06-12 PROCEDURE — 6370000000 HC RX 637 (ALT 250 FOR IP): Performed by: INTERNAL MEDICINE

## 2024-06-12 PROCEDURE — 2580000003 HC RX 258: Performed by: HOSPITALIST

## 2024-06-12 PROCEDURE — 74018 RADEX ABDOMEN 1 VIEW: CPT

## 2024-06-12 PROCEDURE — 80069 RENAL FUNCTION PANEL: CPT

## 2024-06-12 PROCEDURE — 82962 GLUCOSE BLOOD TEST: CPT

## 2024-06-12 PROCEDURE — 2500000003 HC RX 250 WO HCPCS: Performed by: INTERNAL MEDICINE

## 2024-06-12 PROCEDURE — 97162 PT EVAL MOD COMPLEX 30 MIN: CPT

## 2024-06-12 PROCEDURE — 1100000000 HC RM PRIVATE

## 2024-06-12 PROCEDURE — 6370000000 HC RX 637 (ALT 250 FOR IP): Performed by: HOSPITALIST

## 2024-06-12 PROCEDURE — 97530 THERAPEUTIC ACTIVITIES: CPT

## 2024-06-12 PROCEDURE — 6360000002 HC RX W HCPCS: Performed by: INTERNAL MEDICINE

## 2024-06-12 PROCEDURE — 80048 BASIC METABOLIC PNL TOTAL CA: CPT

## 2024-06-12 PROCEDURE — 71045 X-RAY EXAM CHEST 1 VIEW: CPT

## 2024-06-12 PROCEDURE — 2580000003 HC RX 258: Performed by: EMERGENCY MEDICINE

## 2024-06-12 PROCEDURE — 2500000003 HC RX 250 WO HCPCS: Performed by: EMERGENCY MEDICINE

## 2024-06-12 PROCEDURE — 84100 ASSAY OF PHOSPHORUS: CPT

## 2024-06-12 RX ORDER — INSULIN LISPRO 100 [IU]/ML
0-4 INJECTION, SOLUTION INTRAVENOUS; SUBCUTANEOUS NIGHTLY
Status: DISCONTINUED | OUTPATIENT
Start: 2024-06-12 | End: 2024-06-19 | Stop reason: HOSPADM

## 2024-06-12 RX ORDER — INSULIN LISPRO 100 [IU]/ML
0-8 INJECTION, SOLUTION INTRAVENOUS; SUBCUTANEOUS
Status: DISCONTINUED | OUTPATIENT
Start: 2024-06-12 | End: 2024-06-18

## 2024-06-12 RX ORDER — SODIUM CHLORIDE 9 MG/ML
INJECTION, SOLUTION INTRAVENOUS CONTINUOUS
Status: DISCONTINUED | OUTPATIENT
Start: 2024-06-12 | End: 2024-06-14

## 2024-06-12 RX ORDER — DEXTROSE MONOHYDRATE 100 MG/ML
INJECTION, SOLUTION INTRAVENOUS CONTINUOUS PRN
Status: DISCONTINUED | OUTPATIENT
Start: 2024-06-12 | End: 2024-06-19 | Stop reason: HOSPADM

## 2024-06-12 RX ORDER — BUDESONIDE 0.25 MG/2ML
0.25 INHALANT ORAL
Status: DISCONTINUED | OUTPATIENT
Start: 2024-06-12 | End: 2024-06-12

## 2024-06-12 RX ORDER — INSULIN GLARGINE 100 [IU]/ML
15 INJECTION, SOLUTION SUBCUTANEOUS DAILY
Status: DISCONTINUED | OUTPATIENT
Start: 2024-06-12 | End: 2024-06-13

## 2024-06-12 RX ORDER — INSULIN LISPRO 100 [IU]/ML
5 INJECTION, SOLUTION INTRAVENOUS; SUBCUTANEOUS
Status: DISCONTINUED | OUTPATIENT
Start: 2024-06-12 | End: 2024-06-15

## 2024-06-12 RX ORDER — PHENAZOPYRIDINE HYDROCHLORIDE 100 MG/1
200 TABLET, FILM COATED ORAL
Status: DISCONTINUED | OUTPATIENT
Start: 2024-06-12 | End: 2024-06-14

## 2024-06-12 RX ORDER — GLUCAGON 1 MG/ML
1 KIT INJECTION PRN
Status: DISCONTINUED | OUTPATIENT
Start: 2024-06-12 | End: 2024-06-19 | Stop reason: HOSPADM

## 2024-06-12 RX ORDER — PANTOPRAZOLE SODIUM 40 MG/1
40 TABLET, DELAYED RELEASE ORAL
Status: DISCONTINUED | OUTPATIENT
Start: 2024-06-12 | End: 2024-06-19 | Stop reason: HOSPADM

## 2024-06-12 RX ADMIN — PHENAZOPYRIDINE 200 MG: 100 TABLET ORAL at 09:22

## 2024-06-12 RX ADMIN — PANTOPRAZOLE SODIUM 40 MG: 40 TABLET, DELAYED RELEASE ORAL at 19:48

## 2024-06-12 RX ADMIN — ACETAMINOPHEN 650 MG: 325 TABLET ORAL at 15:31

## 2024-06-12 RX ADMIN — INSULIN GLARGINE 15 UNITS: 100 INJECTION, SOLUTION SUBCUTANEOUS at 08:19

## 2024-06-12 RX ADMIN — ONDANSETRON 4 MG: 2 INJECTION INTRAMUSCULAR; INTRAVENOUS at 18:28

## 2024-06-12 RX ADMIN — INSULIN LISPRO 5 UNITS: 100 INJECTION, SOLUTION INTRAVENOUS; SUBCUTANEOUS at 12:07

## 2024-06-12 RX ADMIN — SODIUM CHLORIDE, PRESERVATIVE FREE 10 ML: 5 INJECTION INTRAVENOUS at 20:30

## 2024-06-12 RX ADMIN — INSULIN LISPRO 2 UNITS: 100 INJECTION, SOLUTION INTRAVENOUS; SUBCUTANEOUS at 18:28

## 2024-06-12 RX ADMIN — SODIUM CHLORIDE, PRESERVATIVE FREE 10 ML: 5 INJECTION INTRAVENOUS at 08:20

## 2024-06-12 RX ADMIN — INSULIN LISPRO 5 UNITS: 100 INJECTION, SOLUTION INTRAVENOUS; SUBCUTANEOUS at 18:28

## 2024-06-12 RX ADMIN — POLYETHYLENE GLYCOL 3350 17 G: 17 POWDER, FOR SOLUTION ORAL at 08:19

## 2024-06-12 RX ADMIN — WATER 2000 MG: 1 INJECTION INTRAMUSCULAR; INTRAVENOUS; SUBCUTANEOUS at 20:26

## 2024-06-12 RX ADMIN — SODIUM PHOSPHATE, MONOBASIC, MONOHYDRATE AND SODIUM PHOSPHATE, DIBASIC, ANHYDROUS 15 MMOL: 142; 276 INJECTION, SOLUTION INTRAVENOUS at 15:30

## 2024-06-12 RX ADMIN — ONDANSETRON 4 MG: 2 INJECTION INTRAMUSCULAR; INTRAVENOUS at 11:59

## 2024-06-12 RX ADMIN — PROMETHAZINE HYDROCHLORIDE 12.5 MG: 25 INJECTION INTRAMUSCULAR; INTRAVENOUS at 21:48

## 2024-06-12 RX ADMIN — POTASSIUM CHLORIDE, DEXTROSE MONOHYDRATE AND SODIUM CHLORIDE: 150; 5; 900 INJECTION, SOLUTION INTRAVENOUS at 03:33

## 2024-06-12 RX ADMIN — SODIUM CHLORIDE: 9 INJECTION, SOLUTION INTRAVENOUS at 18:28

## 2024-06-12 RX ADMIN — ASPIRIN 81 MG: 81 TABLET, CHEWABLE ORAL at 08:19

## 2024-06-12 RX ADMIN — SODIUM CHLORIDE: 9 INJECTION, SOLUTION INTRAVENOUS at 08:19

## 2024-06-12 RX ADMIN — ACETAMINOPHEN 650 MG: 325 TABLET ORAL at 06:49

## 2024-06-12 RX ADMIN — ASPIRIN 81 MG: 81 TABLET, CHEWABLE ORAL at 20:18

## 2024-06-12 RX ADMIN — INSULIN LISPRO 2 UNITS: 100 INJECTION, SOLUTION INTRAVENOUS; SUBCUTANEOUS at 12:07

## 2024-06-12 RX ADMIN — ALUMINUM HYDROXIDE AND MAGNESIUM HYDROXIDE 20 ML: 200; 200 SUSPENSION ORAL at 19:47

## 2024-06-12 RX ADMIN — DOCUSATE SODIUM 200 MG: 100 CAPSULE, LIQUID FILLED ORAL at 08:19

## 2024-06-12 ASSESSMENT — PAIN SCALES - GENERAL
PAINLEVEL_OUTOF10: 0
PAINLEVEL_OUTOF10: 3
PAINLEVEL_OUTOF10: 9

## 2024-06-12 ASSESSMENT — PAIN - FUNCTIONAL ASSESSMENT: PAIN_FUNCTIONAL_ASSESSMENT: ACTIVITIES ARE NOT PREVENTED

## 2024-06-12 ASSESSMENT — PAIN DESCRIPTION - DESCRIPTORS: DESCRIPTORS: CRAMPING

## 2024-06-12 ASSESSMENT — PAIN DESCRIPTION - LOCATION: LOCATION: ABDOMEN

## 2024-06-12 NOTE — CONSULTS
[facility]  Pulmonary, Critical Care, and Sleep Medicine    Name: Abhilash Suh MRN: [id number]   : 1985 Hospital: [facility]    Date: 2024  Room: [address] [place] [address] Note                                              Consult requesting physician: Dr. adame    Reason for Consult: Metabolic acidosis, hyperglycemia, DKA, nausea vomiting      IMPRESSION:   Diabetic ketoacidosis  Metabolic acidosis  Nausea vomiting  UTI  Recent patellar fracture  Constipations    Patient Active Problem List   Diagnosis    Metabolic acidosis    Nausea & vomiting    Constipation    UTI (urinary tract infection)    Diabetic ketoacidosis without coma associated with type 1 diabetes mellitus (HCC)             Code status: full code       RECOMMENDATIONS:   Respiratory:   Stable respiratory status.  CTA on 2024  IMPRESSION:  1. No evidence pulmonary embolism.   2. No bowel obstruction, ileus or perforation. No intra-abdominal abscess.  Moderate amount of stool in the colon.        Chest x-ray pending.  Keep SPO2 >=92%. HOB 30 degree elevation all the time. Aggressive pulmonary toileting. Aspiration precautions. Incentive spirometry.  CVS: White blood cells decreased.  Recent nausea vomiting, constipations after left patellar repair.    Mild tachycardia, blood pressure stable, severe dehydration  ID:   UTI no fever white blood cell stable.  Chest x-ray and abdominal x-ray  Ceftriaxone for 5 days continue due to nausea and vomiting I would not switch to oral Cipro yet.  Sepsis bundle and protocol followed. Follow serial lactic acid, frequent BMP check, fluid resuscitation.  Follow cultures.   Deescalate antibiotic when appropriate.  Hematology/Oncology:   Stable hemoglobin DVT prophylaxis   SCD on the right lower extremity    Renal:   Anion gap metabolic acidosis related to both dehydration, starvation, DKA.  Quickly resolved with hydration and minimal insulin.  Now back on

## 2024-06-12 NOTE — ED NOTES
TRANSFER - OUT REPORT:    Verbal report given to SANAM Jarvis on Abhilash Suh  being transferred to ICU for routine progression of patient care       Report consisted of patient's Situation, Background, Assessment and   Recommendations(SBAR).     Information from the following report(s) Nurse Handoff Report, Index, ED Encounter Summary, ED SBAR, Adult Overview, MAR, and Recent Results was reviewed with the receiving nurse.    Ch[place]io Fall Assessment:                           Lines:   Peripheral IV 06/11/24 Left Antecubital (Active)       Peripheral IV 06/11/24 Left Hand (Active)       Extended Dwell Peripherial IV 06/11/24 Right Basilic (Active)        Opportunity for questions and clarification was provided.      Patient transported with:  Registered Nurse       
5 /hpf    RBC, UA 4 to 10 0 - 5 /hpf    Epithelial Cells, UA Negative 0 - 5 /lpf    BACTERIA, URINE FEW (A) NEG /hpf    WBC Casts, UA 0 to 3 NEG /lpf    Yeast, UA 4+ (A) NEG   POC CG4:BLOOD GAS,LACTATE    Collection Time: 06/11/24  5:25 PM   Result Value Ref Range    POC pH 7.24 (LL) 7.35 - 7.45      POC pCO2 28.7 (L) 35.0 - 45.0 MMHG    POC PO2 23 (LL) 80 - 100 MMHG    POC HCO3 12.3 (L) 22 - 26 MMOL/L    POC O2 SAT 32.3 (L) 92 - 97 %    Base Deficit (POC) 13.6 mmol/L    Specimen type: VENOUS BLOOD      Performed by: Dorian Perera     POC Lactic Acid 1.17 0.40 - 2.00 mmol/L   POCT Glucose    Collection Time: 06/11/24  5:31 PM   Result Value Ref Range    POC Glucose 306 (H) 70 - 110 mg/dL   POCT Glucose    Collection Time: 06/11/24  6:00 PM   Result Value Ref Range    POC Glucose 283 (H) 70 - 110 mg/dL   POCT Glucose    Collection Time: 06/11/24  7:19 PM   Result Value Ref Range    POC Glucose 264 (H) 70 - 110 mg/dL   POCT Glucose    Collection Time: 06/11/24  8:32 PM   Result Value Ref Range    POC Glucose 188 (H) 70 - 110 mg/dL   Magnesium    Collection Time: 06/11/24  9:28 PM   Result Value Ref Range    Magnesium 2.0 1.6 - 2.6 mg/dL   Phosphorus    Collection Time: 06/11/24  9:28 PM   Result Value Ref Range    Phosphorus 2.0 (L) 2.5 - 4.9 MG/DL   Basic Metabolic Panel    Collection Time: 06/11/24  9:28 PM   Result Value Ref Range    Sodium 140 136 - 145 mmol/L    Potassium 3.8 3.5 - 5.5 mmol/L    Chloride 114 (H) 100 - 111 mmol/L    CO2 17 (L) 21 - 32 mmol/L    Anion Gap 9 3.0 - 18 mmol/L    Glucose 191 (H) 74 - 99 mg/dL    BUN 14 7.0 - 18 MG/DL    Creatinine 0.74 0.6 - 1.3 MG/DL    BUN/Creatinine Ratio 19 12 - 20      Est, Glom Filt Rate >90 >60 ml/min/1.73m2    Calcium 8.5 8.5 - 10.1 MG/DL   POCT Glucose    Collection Time: 06/11/24 10:46 PM   Result Value Ref Range    POC Glucose 182 (H) 70 - 110 mg/dL       CTA CHEST W WO CONTRAST    (Results Pending)   CT ABDOMEN PELVIS W IV CONTRAST Additional Contrast? None

## 2024-06-12 NOTE — ACP (ADVANCE CARE PLANNING)
Advance Care Planning     Advance Care Planning Inpatient Note  [facility] Department    Today's Date: 6/12/2024  Unit: [facility] INTENSIVE CARE    Received request from IDT Member.  Upon review of chart and communication with care team, patient's decision making abilities are not in question.. Patient and Healthcare Decision Maker was/were present in the room during visit.    Goals of ACP Conversation:  Discuss advance care planning documents    Health Care Decision Makers:       Primary Decision Maker: Rupa Suh - Parent - [phone]    Primary Decision Maker: Hamlet Garcia - Parent - [phone]    Summary:  Completed New Documents    Advance Care Planning Documents (Patient Wishes):  Healthcare Power of /Advance Directive Appointment of Health Care Agent     Assessment:  Patient's father was at the bedside. Patient has children, one of whom is over 18 but she doesn't think that one should have such a responsibility. Patient wants both parents equally.     Interventions:  Provided education on documents for clarity and greater understanding  Discussed and provided education on state decision maker hierarchy  Assisted in the completion of documents according to patient's wishes at this time  Encouraged ongoing ACP conversation with future decision makers and loved ones    Care Preferences Communicated:   No    Outcomes/Plan:  ACP Discussion: Completed  New advance directive completed.  Returned original document(s) to patient, as well as copies for distribution to appointed agents  Copy of advance directive given to staff to scan into medical record.    Electronically signed by YAJAIRA DIXON on 6/12/2024 at 3:11 PM

## 2024-06-12 NOTE — H&P
Urine >160 (A) NEG mg/dL    Bilirubin, Urine Negative NEG      Blood, Urine MODERATE (A) NEG      Urobilinogen, Urine 0.2 0.2 - 1.0 EU/dL    Nitrite, Urine Negative NEG      Leukocyte Esterase, Urine MODERATE (A) NEG     Urinalysis, Micro    Collection Time: 06/11/24  4:40 PM   Result Value Ref Range    WBC, UA TOO NUMEROUS TO COUNT 0 - 5 /hpf    RBC, UA 4 to 10 0 - 5 /hpf    Epithelial Cells, UA Negative 0 - 5 /lpf    BACTERIA, URINE FEW (A) NEG /hpf    WBC Casts, UA 0 to 3 NEG /lpf    Yeast, UA 4+ (A) NEG   POC CG4:BLOOD GAS,LACTATE    Collection Time: 06/11/24  5:25 PM   Result Value Ref Range    POC pH 7.24 (LL) 7.35 - 7.45      POC pCO2 28.7 (L) 35.0 - 45.0 MMHG    POC PO2 23 (LL) 80 - 100 MMHG    POC HCO3 12.3 (L) 22 - 26 MMOL/L    POC O2 SAT 32.3 (L) 92 - 97 %    Base Deficit (POC) 13.6 mmol/L    Specimen type: VENOUS BLOOD      Performed by: Dorian Perera     POC Lactic Acid 1.17 0.40 - 2.00 mmol/L   POCT Glucose    Collection Time: 06/11/24  5:31 PM   Result Value Ref Range    POC Glucose 306 (H) 70 - 110 mg/dL   POCT Glucose    Collection Time: 06/11/24  6:00 PM   Result Value Ref Range    POC Glucose 283 (H) 70 - 110 mg/dL   POCT Glucose    Collection Time: 06/11/24  7:19 PM   Result Value Ref Range    POC Glucose 264 (H) 70 - 110 mg/dL       Imaging Reviewed:  CTA CHEST W WO CONTRAST    Result Date: 6/11/2024  1. No evidence pulmonary embolism. 2. No bowel obstruction, ileus or perforation. No intra-abdominal abscess. Moderate amount of stool in the colon.  Electronically signed by Carlos Garcia MD    CT ABDOMEN PELVIS W IV CONTRAST Additional Contrast? None    Result Date: 6/11/2024  1. No evidence pulmonary embolism. 2. No bowel obstruction, ileus or perforation. No intra-abdominal abscess. Moderate amount of stool in the colon.  Electronically signed by MD Kaushal Becker MD  6/11/2024, 8:37 PM        Disclaimer: Sections of this note are dictated using utilizing voice

## 2024-06-13 LAB
ANION GAP SERPL CALC-SCNC: 6 MMOL/L (ref 3–18)
BACTERIA SPEC CULT: NORMAL
BASOPHILS # BLD: 0 K/UL (ref 0–0.1)
BASOPHILS NFR BLD: 1 % (ref 0–2)
BUN SERPL-MCNC: 4 MG/DL (ref 7–18)
BUN/CREAT SERPL: 14 (ref 12–20)
CALCIUM SERPL-MCNC: 7.9 MG/DL (ref 8.5–10.1)
CHLORIDE SERPL-SCNC: 111 MMOL/L (ref 100–111)
CO2 SERPL-SCNC: 24 MMOL/L (ref 21–32)
CREAT SERPL-MCNC: 0.28 MG/DL (ref 0.6–1.3)
DIFFERENTIAL METHOD BLD: ABNORMAL
EOSINOPHIL # BLD: 0.2 K/UL (ref 0–0.4)
EOSINOPHIL NFR BLD: 4 % (ref 0–5)
ERYTHROCYTE [DISTWIDTH] IN BLOOD BY AUTOMATED COUNT: 11.9 % (ref 11.6–14.5)
GLUCOSE BLD STRIP.AUTO-MCNC: 112 MG/DL (ref 70–110)
GLUCOSE BLD STRIP.AUTO-MCNC: 222 MG/DL (ref 70–110)
GLUCOSE BLD STRIP.AUTO-MCNC: 243 MG/DL (ref 70–110)
GLUCOSE BLD STRIP.AUTO-MCNC: 64 MG/DL (ref 70–110)
GLUCOSE BLD STRIP.AUTO-MCNC: 65 MG/DL (ref 70–110)
GLUCOSE BLD STRIP.AUTO-MCNC: 71 MG/DL (ref 70–110)
GLUCOSE BLD STRIP.AUTO-MCNC: 98 MG/DL (ref 70–110)
GLUCOSE SERPL-MCNC: 184 MG/DL (ref 74–99)
HCT VFR BLD AUTO: 29.5 % (ref 35–45)
HGB BLD-MCNC: 9.8 G/DL (ref 12–16)
IMM GRANULOCYTES # BLD AUTO: 0 K/UL (ref 0–0.04)
IMM GRANULOCYTES NFR BLD AUTO: 0 % (ref 0–0.5)
LYMPHOCYTES # BLD: 1.5 K/UL (ref 0.9–3.6)
LYMPHOCYTES NFR BLD: 34 % (ref 21–52)
MCH RBC QN AUTO: 30 PG (ref 24–34)
MCHC RBC AUTO-ENTMCNC: 33.2 G/DL (ref 31–37)
MCV RBC AUTO: 90.2 FL (ref 78–100)
MONOCYTES # BLD: 0.5 K/UL (ref 0.05–1.2)
MONOCYTES NFR BLD: 12 % (ref 3–10)
NEUTS SEG # BLD: 2.1 K/UL (ref 1.8–8)
NEUTS SEG NFR BLD: 50 % (ref 40–73)
NRBC # BLD: 0 K/UL (ref 0–0.01)
NRBC BLD-RTO: 0 PER 100 WBC
PLATELET # BLD AUTO: 347 K/UL (ref 135–420)
PMV BLD AUTO: 9.1 FL (ref 9.2–11.8)
POTASSIUM SERPL-SCNC: 3.5 MMOL/L (ref 3.5–5.5)
RBC # BLD AUTO: 3.27 M/UL (ref 4.2–5.3)
SERVICE CMNT-IMP: NORMAL
SODIUM SERPL-SCNC: 141 MMOL/L (ref 136–145)
WBC # BLD AUTO: 4.3 K/UL (ref 4.6–13.2)

## 2024-06-13 PROCEDURE — 97165 OT EVAL LOW COMPLEX 30 MIN: CPT

## 2024-06-13 PROCEDURE — 97535 SELF CARE MNGMENT TRAINING: CPT

## 2024-06-13 PROCEDURE — 82962 GLUCOSE BLOOD TEST: CPT

## 2024-06-13 PROCEDURE — 97530 THERAPEUTIC ACTIVITIES: CPT

## 2024-06-13 PROCEDURE — 6370000000 HC RX 637 (ALT 250 FOR IP): Performed by: INTERNAL MEDICINE

## 2024-06-13 PROCEDURE — 36415 COLL VENOUS BLD VENIPUNCTURE: CPT

## 2024-06-13 PROCEDURE — 6360000002 HC RX W HCPCS: Performed by: INTERNAL MEDICINE

## 2024-06-13 PROCEDURE — 85025 COMPLETE CBC W/AUTO DIFF WBC: CPT

## 2024-06-13 PROCEDURE — 80048 BASIC METABOLIC PNL TOTAL CA: CPT

## 2024-06-13 PROCEDURE — 1100000000 HC RM PRIVATE

## 2024-06-13 PROCEDURE — 6360000002 HC RX W HCPCS: Performed by: HOSPITALIST

## 2024-06-13 PROCEDURE — 6370000000 HC RX 637 (ALT 250 FOR IP): Performed by: HOSPITALIST

## 2024-06-13 PROCEDURE — 2580000003 HC RX 258: Performed by: INTERNAL MEDICINE

## 2024-06-13 PROCEDURE — 2580000003 HC RX 258: Performed by: HOSPITALIST

## 2024-06-13 RX ORDER — ENEMA 19; 7 G/133ML; G/133ML
1 ENEMA RECTAL
Status: ACTIVE | OUTPATIENT
Start: 2024-06-13 | End: 2024-06-14

## 2024-06-13 RX ORDER — INSULIN GLARGINE 100 [IU]/ML
12 INJECTION, SOLUTION SUBCUTANEOUS DAILY
Status: DISCONTINUED | OUTPATIENT
Start: 2024-06-14 | End: 2024-06-17

## 2024-06-13 RX ORDER — LACTULOSE 10 G/15ML
30 SOLUTION ORAL ONCE
Status: COMPLETED | OUTPATIENT
Start: 2024-06-13 | End: 2024-06-13

## 2024-06-13 RX ADMIN — INSULIN GLARGINE 15 UNITS: 100 INJECTION, SOLUTION SUBCUTANEOUS at 08:54

## 2024-06-13 RX ADMIN — ALUMINUM HYDROXIDE AND MAGNESIUM HYDROXIDE 20 ML: 200; 200 SUSPENSION ORAL at 02:32

## 2024-06-13 RX ADMIN — LACTULOSE 30 G: 10 SOLUTION ORAL at 12:07

## 2024-06-13 RX ADMIN — POLYETHYLENE GLYCOL 3350 17 G: 17 POWDER, FOR SOLUTION ORAL at 08:48

## 2024-06-13 RX ADMIN — ASPIRIN 81 MG: 81 TABLET, CHEWABLE ORAL at 08:54

## 2024-06-13 RX ADMIN — SODIUM CHLORIDE, PRESERVATIVE FREE 10 ML: 5 INJECTION INTRAVENOUS at 21:00

## 2024-06-13 RX ADMIN — WATER 2000 MG: 1 INJECTION INTRAMUSCULAR; INTRAVENOUS; SUBCUTANEOUS at 20:17

## 2024-06-13 RX ADMIN — ACETAMINOPHEN 650 MG: 325 TABLET ORAL at 20:25

## 2024-06-13 RX ADMIN — SODIUM CHLORIDE, PRESERVATIVE FREE 10 ML: 5 INJECTION INTRAVENOUS at 08:55

## 2024-06-13 RX ADMIN — INSULIN LISPRO 5 UNITS: 100 INJECTION, SOLUTION INTRAVENOUS; SUBCUTANEOUS at 12:07

## 2024-06-13 RX ADMIN — ASPIRIN 81 MG: 81 TABLET, CHEWABLE ORAL at 20:18

## 2024-06-13 RX ADMIN — INSULIN LISPRO 5 UNITS: 100 INJECTION, SOLUTION INTRAVENOUS; SUBCUTANEOUS at 08:53

## 2024-06-13 RX ADMIN — INSULIN LISPRO 2 UNITS: 100 INJECTION, SOLUTION INTRAVENOUS; SUBCUTANEOUS at 08:50

## 2024-06-13 RX ADMIN — PHENAZOPYRIDINE 200 MG: 100 TABLET ORAL at 19:53

## 2024-06-13 RX ADMIN — BISACODYL 10 MG: 5 TABLET, COATED ORAL at 02:32

## 2024-06-13 RX ADMIN — POLYETHYLENE GLYCOL 3350 17 G: 17 POWDER, FOR SOLUTION ORAL at 02:34

## 2024-06-13 RX ADMIN — DOCUSATE SODIUM 200 MG: 100 CAPSULE, LIQUID FILLED ORAL at 08:54

## 2024-06-13 RX ADMIN — DIBASIC SODIUM PHOSPHATE, MONOBASIC POTASSIUM PHOSPHATE AND MONOBASIC SODIUM PHOSPHATE 2 TABLET: 852; 155; 130 TABLET ORAL at 12:07

## 2024-06-13 RX ADMIN — PHENAZOPYRIDINE 200 MG: 100 TABLET ORAL at 08:54

## 2024-06-13 RX ADMIN — PHENAZOPYRIDINE 200 MG: 100 TABLET ORAL at 12:06

## 2024-06-13 RX ADMIN — ONDANSETRON 4 MG: 2 INJECTION INTRAMUSCULAR; INTRAVENOUS at 08:50

## 2024-06-13 RX ADMIN — PANTOPRAZOLE SODIUM 40 MG: 40 TABLET, DELAYED RELEASE ORAL at 05:55

## 2024-06-13 ASSESSMENT — PAIN DESCRIPTION - ORIENTATION: ORIENTATION: LEFT

## 2024-06-13 ASSESSMENT — PAIN DESCRIPTION - DESCRIPTORS: DESCRIPTORS: ACHING

## 2024-06-13 ASSESSMENT — PAIN - FUNCTIONAL ASSESSMENT: PAIN_FUNCTIONAL_ASSESSMENT: ACTIVITIES ARE NOT PREVENTED

## 2024-06-13 ASSESSMENT — PAIN DESCRIPTION - LOCATION: LOCATION: KNEE

## 2024-06-13 ASSESSMENT — PAIN SCALES - GENERAL: PAINLEVEL_OUTOF10: 3

## 2024-06-13 NOTE — PLAN OF CARE
Problem: Discharge Planning  Goal: Discharge to home or other facility with appropriate resources  Outcome: Progressing     Problem: Chronic Conditions and Co-morbidities  Goal: Patient's chronic conditions and co-morbidity symptoms are monitored and maintained or improved  Outcome: Progressing     Problem: Safety - Adult  Goal: Free from fall injury  Outcome: Progressing     Problem: Pain  Goal: Verbalizes/displays adequate comfort level or baseline comfort level  Outcome: Progressing  Flowsheets (Taken 6/12/2024 1200 by Kitty Morillo RN)  Verbalizes/displays adequate comfort level or baseline comfort level:   Assess pain using appropriate pain scale   Administer analgesics based on type and severity of pain and evaluate response   Implement non-pharmacological measures as appropriate and evaluate response

## 2024-06-13 NOTE — CONSULTS
Consult Note    Patient: Abhilash Suh               Sex: female          DOA: 2024         YOB: 1985      Age:  38 y.o.        LOS:  LOS: 2 days              HPI:     Abhilash Suh is a 38 y.o. female who has been seen for left knee. Patient is s/p left patella ORIF 2024. Was admitted recently for nausea/vomiting and constipation. She was noted to have UTI, metabolic acidosis. PMHx significant for Type 1 Diabetes. She reports minimal knee pain. Has been using knee immobilizer.    Past Medical History:   Diagnosis Date    Central retinal vein occlusion of left eye     Chronic diarrhea     Diabetes mellitus type 1 (HCC)     6 y/o    Diabetic retinopathy (HCC)     Nausea & vomiting 2024    Wears glasses        Past Surgical History:   Procedure Laterality Date     SECTION      *4    PATELLA FRACTURE SURGERY Left 2024    LEFT KNEE PATELLA OPEN REDUCTION INTERNAL FIXATION WITH C-ARM performed by Jaydon Hicks DO at [facility] MAIN OR       No family history on file.    Social History     Socioeconomic History    Marital status: Single   Tobacco Use    Smoking status: Never    Smokeless tobacco: Never   Vaping Use    Vaping Use: Never used   Substance and Sexual Activity    Alcohol use: No    Drug use: No       Prior to Admission medications    Medication Sig Start Date End Date Taking? Authorizing Provider   aspirin (ASPIRIN 81) 81 MG chewable tablet Take 1 tablet by mouth in the morning and 1 tablet in the evening. Do all this for 21 days. 24  Geetha Cortez PA-C   meloxicam (MOBIC) 15 MG tablet Take 1 tablet by mouth daily 24  Geetha Cortez PA-C   HYDROcodone-acetaminophen (NORCO) 5-325 MG per tablet Take 1 tablet by mouth every 6 hours as needed for Pain. 24   Gus Mayers MD   vitamin D (ERGOCALCIFEROL) 1.25 MG (16889 UT) CAPS capsule Take 1 capsule by mouth once a week Indications:     Gus Mayers MD

## 2024-06-14 LAB
ANION GAP SERPL CALC-SCNC: 5 MMOL/L (ref 3–18)
BUN SERPL-MCNC: 3 MG/DL (ref 7–18)
BUN/CREAT SERPL: 5 (ref 12–20)
CALCIUM SERPL-MCNC: 7.9 MG/DL (ref 8.5–10.1)
CHLORIDE SERPL-SCNC: 114 MMOL/L (ref 100–111)
CO2 SERPL-SCNC: 24 MMOL/L (ref 21–32)
CREAT SERPL-MCNC: 0.55 MG/DL (ref 0.6–1.3)
GLUCOSE BLD STRIP.AUTO-MCNC: 146 MG/DL (ref 70–110)
GLUCOSE BLD STRIP.AUTO-MCNC: 197 MG/DL (ref 70–110)
GLUCOSE BLD STRIP.AUTO-MCNC: 241 MG/DL (ref 70–110)
GLUCOSE BLD STRIP.AUTO-MCNC: 269 MG/DL (ref 70–110)
GLUCOSE SERPL-MCNC: 174 MG/DL (ref 74–99)
POTASSIUM SERPL-SCNC: 3 MMOL/L (ref 3.5–5.5)
SODIUM SERPL-SCNC: 143 MMOL/L (ref 136–145)

## 2024-06-14 PROCEDURE — 2580000003 HC RX 258: Performed by: HOSPITALIST

## 2024-06-14 PROCEDURE — 97116 GAIT TRAINING THERAPY: CPT

## 2024-06-14 PROCEDURE — 2580000003 HC RX 258: Performed by: INTERNAL MEDICINE

## 2024-06-14 PROCEDURE — 97530 THERAPEUTIC ACTIVITIES: CPT

## 2024-06-14 PROCEDURE — 36415 COLL VENOUS BLD VENIPUNCTURE: CPT

## 2024-06-14 PROCEDURE — 1100000000 HC RM PRIVATE

## 2024-06-14 PROCEDURE — 6360000002 HC RX W HCPCS: Performed by: HOSPITALIST

## 2024-06-14 PROCEDURE — 6370000000 HC RX 637 (ALT 250 FOR IP): Performed by: HOSPITALIST

## 2024-06-14 PROCEDURE — 6370000000 HC RX 637 (ALT 250 FOR IP): Performed by: INTERNAL MEDICINE

## 2024-06-14 PROCEDURE — 82962 GLUCOSE BLOOD TEST: CPT

## 2024-06-14 PROCEDURE — 80048 BASIC METABOLIC PNL TOTAL CA: CPT

## 2024-06-14 RX ADMIN — PANTOPRAZOLE SODIUM 40 MG: 40 TABLET, DELAYED RELEASE ORAL at 06:34

## 2024-06-14 RX ADMIN — ASPIRIN 81 MG: 81 TABLET, CHEWABLE ORAL at 08:24

## 2024-06-14 RX ADMIN — PHENAZOPYRIDINE 200 MG: 100 TABLET ORAL at 08:24

## 2024-06-14 RX ADMIN — POTASSIUM BICARBONATE 40 MEQ: 782 TABLET, EFFERVESCENT ORAL at 19:42

## 2024-06-14 RX ADMIN — ASPIRIN 81 MG: 81 TABLET, CHEWABLE ORAL at 19:43

## 2024-06-14 RX ADMIN — WATER 2000 MG: 1 INJECTION INTRAMUSCULAR; INTRAVENOUS; SUBCUTANEOUS at 19:43

## 2024-06-14 RX ADMIN — HYDROCODONE BITARTRATE AND ACETAMINOPHEN 1 TABLET: 5; 325 TABLET ORAL at 12:24

## 2024-06-14 RX ADMIN — SODIUM CHLORIDE, PRESERVATIVE FREE 10 ML: 5 INJECTION INTRAVENOUS at 08:28

## 2024-06-14 RX ADMIN — SODIUM CHLORIDE, PRESERVATIVE FREE 10 ML: 5 INJECTION INTRAVENOUS at 19:49

## 2024-06-14 RX ADMIN — INSULIN GLARGINE 12 UNITS: 100 INJECTION, SOLUTION SUBCUTANEOUS at 08:25

## 2024-06-14 RX ADMIN — POTASSIUM BICARBONATE 40 MEQ: 782 TABLET, EFFERVESCENT ORAL at 08:24

## 2024-06-14 RX ADMIN — INSULIN LISPRO 4 UNITS: 100 INJECTION, SOLUTION INTRAVENOUS; SUBCUTANEOUS at 16:33

## 2024-06-14 ASSESSMENT — PAIN SCALES - GENERAL
PAINLEVEL_OUTOF10: 1
PAINLEVEL_OUTOF10: 5
PAINLEVEL_OUTOF10: 2

## 2024-06-14 ASSESSMENT — PAIN DESCRIPTION - ONSET: ONSET: ON-GOING

## 2024-06-14 ASSESSMENT — PAIN DESCRIPTION - FREQUENCY: FREQUENCY: CONTINUOUS

## 2024-06-14 ASSESSMENT — PAIN DESCRIPTION - LOCATION: LOCATION: KNEE

## 2024-06-14 ASSESSMENT — PAIN DESCRIPTION - DESCRIPTORS: DESCRIPTORS: ACHING;SORE

## 2024-06-14 ASSESSMENT — PAIN - FUNCTIONAL ASSESSMENT: PAIN_FUNCTIONAL_ASSESSMENT: PREVENTS OR INTERFERES SOME ACTIVE ACTIVITIES AND ADLS

## 2024-06-14 ASSESSMENT — PAIN DESCRIPTION - ORIENTATION: ORIENTATION: LEFT

## 2024-06-15 PROBLEM — Z98.890 STATUS POST SURGERY: Status: ACTIVE | Noted: 2024-06-15

## 2024-06-15 LAB
ANION GAP SERPL CALC-SCNC: 5 MMOL/L (ref 3–18)
BUN SERPL-MCNC: 5 MG/DL (ref 7–18)
BUN/CREAT SERPL: 12 (ref 12–20)
CALCIUM SERPL-MCNC: 8.2 MG/DL (ref 8.5–10.1)
CHLORIDE SERPL-SCNC: 106 MMOL/L (ref 100–111)
CO2 SERPL-SCNC: 29 MMOL/L (ref 21–32)
CREAT SERPL-MCNC: 0.41 MG/DL (ref 0.6–1.3)
GLUCOSE BLD STRIP.AUTO-MCNC: 168 MG/DL (ref 70–110)
GLUCOSE BLD STRIP.AUTO-MCNC: 241 MG/DL (ref 70–110)
GLUCOSE BLD STRIP.AUTO-MCNC: 248 MG/DL (ref 70–110)
GLUCOSE BLD STRIP.AUTO-MCNC: 284 MG/DL (ref 70–110)
GLUCOSE BLD STRIP.AUTO-MCNC: 307 MG/DL (ref 70–110)
GLUCOSE SERPL-MCNC: 246 MG/DL (ref 74–99)
POTASSIUM SERPL-SCNC: 3.6 MMOL/L (ref 3.5–5.5)
SODIUM SERPL-SCNC: 140 MMOL/L (ref 136–145)

## 2024-06-15 PROCEDURE — 36415 COLL VENOUS BLD VENIPUNCTURE: CPT

## 2024-06-15 PROCEDURE — 2580000003 HC RX 258: Performed by: INTERNAL MEDICINE

## 2024-06-15 PROCEDURE — 1100000000 HC RM PRIVATE

## 2024-06-15 PROCEDURE — 6370000000 HC RX 637 (ALT 250 FOR IP): Performed by: HOSPITALIST

## 2024-06-15 PROCEDURE — 80048 BASIC METABOLIC PNL TOTAL CA: CPT

## 2024-06-15 PROCEDURE — 2580000003 HC RX 258: Performed by: HOSPITALIST

## 2024-06-15 PROCEDURE — 6370000000 HC RX 637 (ALT 250 FOR IP): Performed by: INTERNAL MEDICINE

## 2024-06-15 PROCEDURE — 6360000002 HC RX W HCPCS: Performed by: HOSPITALIST

## 2024-06-15 PROCEDURE — 97530 THERAPEUTIC ACTIVITIES: CPT

## 2024-06-15 PROCEDURE — 82962 GLUCOSE BLOOD TEST: CPT

## 2024-06-15 RX ORDER — INSULIN LISPRO 100 [IU]/ML
3 INJECTION, SOLUTION INTRAVENOUS; SUBCUTANEOUS
Status: DISCONTINUED | OUTPATIENT
Start: 2024-06-15 | End: 2024-06-17

## 2024-06-15 RX ADMIN — WATER 2000 MG: 1 INJECTION INTRAMUSCULAR; INTRAVENOUS; SUBCUTANEOUS at 21:17

## 2024-06-15 RX ADMIN — WATER 2000 MG: 1 INJECTION INTRAMUSCULAR; INTRAVENOUS; SUBCUTANEOUS at 21:10

## 2024-06-15 RX ADMIN — ASPIRIN 81 MG: 81 TABLET, CHEWABLE ORAL at 21:10

## 2024-06-15 RX ADMIN — INSULIN LISPRO 3 UNITS: 100 INJECTION, SOLUTION INTRAVENOUS; SUBCUTANEOUS at 17:12

## 2024-06-15 RX ADMIN — ACETAMINOPHEN 650 MG: 325 TABLET ORAL at 10:01

## 2024-06-15 RX ADMIN — SODIUM CHLORIDE, PRESERVATIVE FREE 10 ML: 5 INJECTION INTRAVENOUS at 08:35

## 2024-06-15 RX ADMIN — ACETAMINOPHEN 650 MG: 325 TABLET ORAL at 01:48

## 2024-06-15 RX ADMIN — DOCUSATE SODIUM 200 MG: 100 CAPSULE, LIQUID FILLED ORAL at 08:34

## 2024-06-15 RX ADMIN — INSULIN GLARGINE 12 UNITS: 100 INJECTION, SOLUTION SUBCUTANEOUS at 08:34

## 2024-06-15 RX ADMIN — POLYETHYLENE GLYCOL 3350 17 G: 17 POWDER, FOR SOLUTION ORAL at 08:30

## 2024-06-15 RX ADMIN — POTASSIUM BICARBONATE 40 MEQ: 782 TABLET, EFFERVESCENT ORAL at 08:30

## 2024-06-15 RX ADMIN — INSULIN LISPRO 2 UNITS: 100 INJECTION, SOLUTION INTRAVENOUS; SUBCUTANEOUS at 08:34

## 2024-06-15 RX ADMIN — PANTOPRAZOLE SODIUM 40 MG: 40 TABLET, DELAYED RELEASE ORAL at 06:21

## 2024-06-15 RX ADMIN — ASPIRIN 81 MG: 81 TABLET, CHEWABLE ORAL at 08:34

## 2024-06-15 RX ADMIN — SODIUM CHLORIDE, PRESERVATIVE FREE 10 ML: 5 INJECTION INTRAVENOUS at 21:18

## 2024-06-15 RX ADMIN — SODIUM CHLORIDE: 9 INJECTION, SOLUTION INTRAVENOUS at 08:35

## 2024-06-15 RX ADMIN — INSULIN LISPRO 6 UNITS: 100 INJECTION, SOLUTION INTRAVENOUS; SUBCUTANEOUS at 12:28

## 2024-06-15 ASSESSMENT — PAIN DESCRIPTION - LOCATION
LOCATION: KNEE
LOCATION: FOOT;KNEE
LOCATION: KNEE

## 2024-06-15 ASSESSMENT — PAIN DESCRIPTION - FREQUENCY: FREQUENCY: CONTINUOUS

## 2024-06-15 ASSESSMENT — PAIN DESCRIPTION - ONSET: ONSET: ON-GOING

## 2024-06-15 ASSESSMENT — PAIN DESCRIPTION - ORIENTATION
ORIENTATION: LEFT

## 2024-06-15 ASSESSMENT — PAIN DESCRIPTION - PAIN TYPE: TYPE: SURGICAL PAIN

## 2024-06-15 ASSESSMENT — PAIN DESCRIPTION - DESCRIPTORS
DESCRIPTORS: ACHING
DESCRIPTORS: ACHING

## 2024-06-15 ASSESSMENT — PAIN SCALES - GENERAL: PAINLEVEL_OUTOF10: 4

## 2024-06-15 NOTE — PLAN OF CARE
Problem: Discharge Planning  Goal: Discharge to home or other facility with appropriate resources  Outcome: Progressing     Problem: Chronic Conditions and Co-morbidities  Goal: Patient's chronic conditions and co-morbidity symptoms are monitored and maintained or improved  Outcome: Progressing     Problem: Safety - Adult  Goal: Free from fall injury  Outcome: Progressing     Problem: Pain  Goal: Verbalizes/displays adequate comfort level or baseline comfort level  Outcome: Progressing     Problem: ABCDS Injury Assessment  Goal: Absence of physical injury  Outcome: Progressing

## 2024-06-16 ENCOUNTER — APPOINTMENT (OUTPATIENT)
Facility: HOSPITAL | Age: 39
DRG: 420 | End: 2024-06-16
Attending: INTERNAL MEDICINE
Payer: MEDICAID

## 2024-06-16 LAB
ANION GAP SERPL CALC-SCNC: 12 MMOL/L (ref 3–18)
BUN SERPL-MCNC: 10 MG/DL (ref 7–18)
BUN/CREAT SERPL: 20 (ref 12–20)
CALCIUM SERPL-MCNC: 8.8 MG/DL (ref 8.5–10.1)
CHLORIDE SERPL-SCNC: 102 MMOL/L (ref 100–111)
CO2 SERPL-SCNC: 24 MMOL/L (ref 21–32)
CREAT SERPL-MCNC: 0.51 MG/DL (ref 0.6–1.3)
ECHO BSA: 1.63 M2
EKG ATRIAL RATE: 125 BPM
EKG DIAGNOSIS: NORMAL
EKG P AXIS: 52 DEGREES
EKG P-R INTERVAL: 130 MS
EKG Q-T INTERVAL: 316 MS
EKG QRS DURATION: 74 MS
EKG QTC CALCULATION (BAZETT): 456 MS
EKG R AXIS: 96 DEGREES
EKG T AXIS: -1 DEGREES
EKG VENTRICULAR RATE: 125 BPM
GLUCOSE BLD STRIP.AUTO-MCNC: 205 MG/DL (ref 70–110)
GLUCOSE BLD STRIP.AUTO-MCNC: 234 MG/DL (ref 70–110)
GLUCOSE BLD STRIP.AUTO-MCNC: 273 MG/DL (ref 70–110)
GLUCOSE BLD STRIP.AUTO-MCNC: 282 MG/DL (ref 70–110)
GLUCOSE SERPL-MCNC: 296 MG/DL (ref 74–99)
POTASSIUM SERPL-SCNC: 3.3 MMOL/L (ref 3.5–5.5)
SODIUM SERPL-SCNC: 138 MMOL/L (ref 136–145)

## 2024-06-16 PROCEDURE — 97530 THERAPEUTIC ACTIVITIES: CPT

## 2024-06-16 PROCEDURE — 82962 GLUCOSE BLOOD TEST: CPT

## 2024-06-16 PROCEDURE — 6370000000 HC RX 637 (ALT 250 FOR IP): Performed by: HOSPITALIST

## 2024-06-16 PROCEDURE — 2580000003 HC RX 258: Performed by: INTERNAL MEDICINE

## 2024-06-16 PROCEDURE — 36415 COLL VENOUS BLD VENIPUNCTURE: CPT

## 2024-06-16 PROCEDURE — 6370000000 HC RX 637 (ALT 250 FOR IP): Performed by: INTERNAL MEDICINE

## 2024-06-16 PROCEDURE — 80048 BASIC METABOLIC PNL TOTAL CA: CPT

## 2024-06-16 PROCEDURE — 6360000002 HC RX W HCPCS: Performed by: HOSPITALIST

## 2024-06-16 PROCEDURE — 93970 EXTREMITY STUDY: CPT

## 2024-06-16 PROCEDURE — 97110 THERAPEUTIC EXERCISES: CPT

## 2024-06-16 PROCEDURE — 1100000000 HC RM PRIVATE

## 2024-06-16 RX ORDER — ENEMA 19; 7 G/133ML; G/133ML
1 ENEMA RECTAL
Status: COMPLETED | OUTPATIENT
Start: 2024-06-16 | End: 2024-06-16

## 2024-06-16 RX ORDER — ENOXAPARIN SODIUM 100 MG/ML
40 INJECTION SUBCUTANEOUS DAILY
Status: DISCONTINUED | OUTPATIENT
Start: 2024-06-16 | End: 2024-06-19 | Stop reason: HOSPADM

## 2024-06-16 RX ORDER — SODIUM PHOSPHATE, DIBASIC AND SODIUM PHOSPHATE, MONOBASIC 3.5; 9.5 G/66ML; G/66ML
1 ENEMA RECTAL ONCE
Status: DISCONTINUED | OUTPATIENT
Start: 2024-06-16 | End: 2024-06-16

## 2024-06-16 RX ADMIN — SODIUM CHLORIDE, PRESERVATIVE FREE 10 ML: 5 INJECTION INTRAVENOUS at 08:35

## 2024-06-16 RX ADMIN — INSULIN LISPRO 3 UNITS: 100 INJECTION, SOLUTION INTRAVENOUS; SUBCUTANEOUS at 08:33

## 2024-06-16 RX ADMIN — POTASSIUM BICARBONATE 40 MEQ: 782 TABLET, EFFERVESCENT ORAL at 16:30

## 2024-06-16 RX ADMIN — PANTOPRAZOLE SODIUM 40 MG: 40 TABLET, DELAYED RELEASE ORAL at 06:15

## 2024-06-16 RX ADMIN — POLYETHYLENE GLYCOL 3350 17 G: 17 POWDER, FOR SOLUTION ORAL at 08:33

## 2024-06-16 RX ADMIN — ENOXAPARIN SODIUM 40 MG: 100 INJECTION SUBCUTANEOUS at 12:21

## 2024-06-16 RX ADMIN — INSULIN GLARGINE 12 UNITS: 100 INJECTION, SOLUTION SUBCUTANEOUS at 08:34

## 2024-06-16 RX ADMIN — INSULIN LISPRO 3 UNITS: 100 INJECTION, SOLUTION INTRAVENOUS; SUBCUTANEOUS at 17:40

## 2024-06-16 RX ADMIN — INSULIN LISPRO 3 UNITS: 100 INJECTION, SOLUTION INTRAVENOUS; SUBCUTANEOUS at 12:20

## 2024-06-16 RX ADMIN — INSULIN LISPRO 2 UNITS: 100 INJECTION, SOLUTION INTRAVENOUS; SUBCUTANEOUS at 17:40

## 2024-06-16 RX ADMIN — DOCUSATE SODIUM 200 MG: 100 CAPSULE, LIQUID FILLED ORAL at 08:33

## 2024-06-16 RX ADMIN — ACETAMINOPHEN 650 MG: 325 TABLET ORAL at 17:44

## 2024-06-16 RX ADMIN — ASPIRIN 81 MG: 81 TABLET, CHEWABLE ORAL at 08:33

## 2024-06-16 RX ADMIN — ACETAMINOPHEN 650 MG: 325 TABLET ORAL at 06:33

## 2024-06-16 RX ADMIN — SODIUM CHLORIDE, PRESERVATIVE FREE 10 ML: 5 INJECTION INTRAVENOUS at 20:24

## 2024-06-16 RX ADMIN — INSULIN LISPRO 4 UNITS: 100 INJECTION, SOLUTION INTRAVENOUS; SUBCUTANEOUS at 12:00

## 2024-06-16 RX ADMIN — SODIUM PHOSPHATE, DIBASIC AND SODIUM PHOSPHATE, MONOBASIC 1 ENEMA: 7; 19 ENEMA RECTAL at 13:32

## 2024-06-16 RX ADMIN — INSULIN LISPRO 4 UNITS: 100 INJECTION, SOLUTION INTRAVENOUS; SUBCUTANEOUS at 08:33

## 2024-06-16 RX ADMIN — ACETAMINOPHEN 650 MG: 325 TABLET ORAL at 02:00

## 2024-06-16 RX ADMIN — ACETAMINOPHEN 650 MG: 325 TABLET ORAL at 22:02

## 2024-06-16 ASSESSMENT — PAIN - FUNCTIONAL ASSESSMENT
PAIN_FUNCTIONAL_ASSESSMENT: ACTIVITIES ARE NOT PREVENTED

## 2024-06-16 ASSESSMENT — PAIN DESCRIPTION - FREQUENCY
FREQUENCY: CONTINUOUS

## 2024-06-16 ASSESSMENT — PAIN DESCRIPTION - ORIENTATION
ORIENTATION: LEFT

## 2024-06-16 ASSESSMENT — PAIN DESCRIPTION - DESCRIPTORS
DESCRIPTORS: ACHING
DESCRIPTORS: ACHING
DESCRIPTORS: ACHING;TIGHTNESS
DESCRIPTORS: ACHING
DESCRIPTORS: ACHING;TIGHTNESS

## 2024-06-16 ASSESSMENT — PAIN DESCRIPTION - ONSET
ONSET: SUDDEN
ONSET: SUDDEN
ONSET: ON-GOING
ONSET: ON-GOING

## 2024-06-16 ASSESSMENT — PAIN SCALES - GENERAL
PAINLEVEL_OUTOF10: 5
PAINLEVEL_OUTOF10: 3
PAINLEVEL_OUTOF10: 4
PAINLEVEL_OUTOF10: 5
PAINLEVEL_OUTOF10: 0
PAINLEVEL_OUTOF10: 6
PAINLEVEL_OUTOF10: 5
PAINLEVEL_OUTOF10: 5
PAINLEVEL_OUTOF10: 0

## 2024-06-16 ASSESSMENT — PAIN DESCRIPTION - PAIN TYPE
TYPE: SURGICAL PAIN
TYPE: ACUTE PAIN
TYPE: ACUTE PAIN
TYPE: SURGICAL PAIN

## 2024-06-16 ASSESSMENT — PAIN DESCRIPTION - LOCATION
LOCATION: LEG
LOCATION: KNEE
LOCATION: LEG
LOCATION: KNEE
LOCATION: KNEE

## 2024-06-16 NOTE — PLAN OF CARE
Problem: Chronic Conditions and Co-morbidities  Goal: Patient's chronic conditions and co-morbidity symptoms are monitored and maintained or improved  6/16/2024 0004 by Hiral Baca RN  Outcome: Progressing  6/15/2024 1338 by Maria M Thomas RN  Outcome: Progressing     Problem: Safety - Adult  Goal: Free from fall injury  6/16/2024 0004 by Hiral Baca RN  Outcome: Progressing  Flowsheets (Taken 6/15/2024 2245)  Free From Fall Injury: Instruct family/caregiver on patient safety  6/15/2024 1338 by Maria M Thomas RN  Outcome: Progressing     Problem: Pain  Goal: Verbalizes/displays adequate comfort level or baseline comfort level  6/16/2024 0004 by Hiral Baca RN  Outcome: Progressing  Flowsheets (Taken 6/15/2024 2245)  Verbalizes/displays adequate comfort level or baseline comfort level:   Encourage patient to monitor pain and request assistance   Assess pain using appropriate pain scale   Administer analgesics based on type and severity of pain and evaluate response   Implement non-pharmacological measures as appropriate and evaluate response   Notify Licensed Independent Practitioner if interventions unsuccessful or patient reports new pain  6/15/2024 1338 by Maria M Thomas RN  Outcome: Progressing     Problem: ABCDS Injury Assessment  Goal: Absence of physical injury  6/16/2024 0004 by Hiral Baca RN  Outcome: Progressing  Flowsheets (Taken 6/15/2024 2245)  Absence of Physical Injury: Implement safety measures based on patient assessment  6/15/2024 1338 by Maria M Thomas RN  Outcome: Progressing

## 2024-06-17 LAB
APPEARANCE UR: ABNORMAL
BACTERIA URNS QL MICRO: ABNORMAL /HPF
BILIRUB UR QL: NEGATIVE
COLOR UR: YELLOW
EPITH CASTS URNS QL MICRO: ABNORMAL /LPF (ref 0–5)
GLUCOSE BLD STRIP.AUTO-MCNC: 128 MG/DL (ref 70–110)
GLUCOSE BLD STRIP.AUTO-MCNC: 213 MG/DL (ref 70–110)
GLUCOSE BLD STRIP.AUTO-MCNC: 274 MG/DL (ref 70–110)
GLUCOSE BLD STRIP.AUTO-MCNC: 315 MG/DL (ref 70–110)
GLUCOSE UR STRIP.AUTO-MCNC: >1000 MG/DL
HGB UR QL STRIP: ABNORMAL
KETONES UR QL STRIP.AUTO: 40 MG/DL
LEUKOCYTE ESTERASE UR QL STRIP.AUTO: ABNORMAL
MAGNESIUM SERPL-MCNC: 1.8 MG/DL (ref 1.6–2.6)
NITRITE UR QL STRIP.AUTO: NEGATIVE
PH UR STRIP: 6 (ref 5–8)
PROT UR STRIP-MCNC: 30 MG/DL
RBC #/AREA URNS HPF: ABNORMAL /HPF (ref 0–5)
SP GR UR REFRACTOMETRY: >1.03 (ref 1–1.03)
UROBILINOGEN UR QL STRIP.AUTO: 0.2 EU/DL (ref 0.2–1)
WBC URNS QL MICRO: ABNORMAL /HPF (ref 0–5)
YEAST URNS QL MICRO: ABNORMAL

## 2024-06-17 PROCEDURE — 2500000003 HC RX 250 WO HCPCS: Performed by: HOSPITALIST

## 2024-06-17 PROCEDURE — 6370000000 HC RX 637 (ALT 250 FOR IP): Performed by: INTERNAL MEDICINE

## 2024-06-17 PROCEDURE — 2580000003 HC RX 258: Performed by: INTERNAL MEDICINE

## 2024-06-17 PROCEDURE — 6370000000 HC RX 637 (ALT 250 FOR IP): Performed by: HOSPITALIST

## 2024-06-17 PROCEDURE — 82962 GLUCOSE BLOOD TEST: CPT

## 2024-06-17 PROCEDURE — 1100000000 HC RM PRIVATE

## 2024-06-17 PROCEDURE — 36415 COLL VENOUS BLD VENIPUNCTURE: CPT

## 2024-06-17 PROCEDURE — 6360000002 HC RX W HCPCS: Performed by: HOSPITALIST

## 2024-06-17 PROCEDURE — 83735 ASSAY OF MAGNESIUM: CPT

## 2024-06-17 PROCEDURE — 81001 URINALYSIS AUTO W/SCOPE: CPT

## 2024-06-17 PROCEDURE — 2580000003 HC RX 258: Performed by: HOSPITALIST

## 2024-06-17 RX ORDER — INSULIN GLARGINE 100 [IU]/ML
15 INJECTION, SOLUTION SUBCUTANEOUS DAILY
Status: DISCONTINUED | OUTPATIENT
Start: 2024-06-18 | End: 2024-06-18

## 2024-06-17 RX ORDER — INSULIN LISPRO 100 [IU]/ML
5 INJECTION, SOLUTION INTRAVENOUS; SUBCUTANEOUS
Status: DISCONTINUED | OUTPATIENT
Start: 2024-06-17 | End: 2024-06-19 | Stop reason: HOSPADM

## 2024-06-17 RX ORDER — MAGNESIUM SULFATE HEPTAHYDRATE 40 MG/ML
2000 INJECTION, SOLUTION INTRAVENOUS ONCE
Status: COMPLETED | OUTPATIENT
Start: 2024-06-17 | End: 2024-06-17

## 2024-06-17 RX ORDER — POLYETHYLENE GLYCOL 3350 17 G/17G
17 POWDER, FOR SOLUTION ORAL 2 TIMES DAILY
Status: DISCONTINUED | OUTPATIENT
Start: 2024-06-17 | End: 2024-06-19 | Stop reason: HOSPADM

## 2024-06-17 RX ADMIN — ENOXAPARIN SODIUM 40 MG: 100 INJECTION SUBCUTANEOUS at 08:07

## 2024-06-17 RX ADMIN — PANTOPRAZOLE SODIUM 40 MG: 40 TABLET, DELAYED RELEASE ORAL at 05:28

## 2024-06-17 RX ADMIN — SODIUM CHLORIDE, PRESERVATIVE FREE 10 ML: 5 INJECTION INTRAVENOUS at 08:09

## 2024-06-17 RX ADMIN — DOCUSATE SODIUM 200 MG: 100 CAPSULE, LIQUID FILLED ORAL at 08:08

## 2024-06-17 RX ADMIN — INSULIN LISPRO 5 UNITS: 100 INJECTION, SOLUTION INTRAVENOUS; SUBCUTANEOUS at 17:03

## 2024-06-17 RX ADMIN — ACETAMINOPHEN 650 MG: 325 TABLET ORAL at 10:54

## 2024-06-17 RX ADMIN — INSULIN LISPRO 3 UNITS: 100 INJECTION, SOLUTION INTRAVENOUS; SUBCUTANEOUS at 08:07

## 2024-06-17 RX ADMIN — ACETAMINOPHEN 650 MG: 325 TABLET ORAL at 16:55

## 2024-06-17 RX ADMIN — ACETAMINOPHEN 650 MG: 325 TABLET ORAL at 20:50

## 2024-06-17 RX ADMIN — ACETAMINOPHEN 650 MG: 325 TABLET ORAL at 05:27

## 2024-06-17 RX ADMIN — POLYETHYLENE GLYCOL 3350 17 G: 17 POWDER, FOR SOLUTION ORAL at 08:08

## 2024-06-17 RX ADMIN — INSULIN LISPRO 4 UNITS: 100 INJECTION, SOLUTION INTRAVENOUS; SUBCUTANEOUS at 17:03

## 2024-06-17 RX ADMIN — SODIUM CHLORIDE, PRESERVATIVE FREE 10 ML: 5 INJECTION INTRAVENOUS at 20:51

## 2024-06-17 RX ADMIN — INSULIN LISPRO 6 UNITS: 100 INJECTION, SOLUTION INTRAVENOUS; SUBCUTANEOUS at 08:07

## 2024-06-17 RX ADMIN — MAGNESIUM SULFATE HEPTAHYDRATE 2000 MG: 40 INJECTION, SOLUTION INTRAVENOUS at 13:38

## 2024-06-17 RX ADMIN — INSULIN GLARGINE 12 UNITS: 100 INJECTION, SOLUTION SUBCUTANEOUS at 08:08

## 2024-06-17 RX ADMIN — WATER 1000 MG: 1 INJECTION INTRAMUSCULAR; INTRAVENOUS; SUBCUTANEOUS at 20:29

## 2024-06-17 RX ADMIN — POLYETHYLENE GLYCOL 3350 17 G: 17 POWDER, FOR SOLUTION ORAL at 20:50

## 2024-06-17 ASSESSMENT — PAIN SCALES - GENERAL
PAINLEVEL_OUTOF10: 4
PAINLEVEL_OUTOF10: 4
PAINLEVEL_OUTOF10: 3
PAINLEVEL_OUTOF10: 5
PAINLEVEL_OUTOF10: 4
PAINLEVEL_OUTOF10: 1
PAINLEVEL_OUTOF10: 4

## 2024-06-17 ASSESSMENT — PAIN DESCRIPTION - DESCRIPTORS: DESCRIPTORS: ACHING

## 2024-06-17 ASSESSMENT — PAIN DESCRIPTION - ORIENTATION
ORIENTATION: LEFT

## 2024-06-17 ASSESSMENT — PAIN SCALES - WONG BAKER
WONGBAKER_NUMERICALRESPONSE: NO HURT
WONGBAKER_NUMERICALRESPONSE: NO HURT

## 2024-06-17 ASSESSMENT — PAIN DESCRIPTION - LOCATION
LOCATION: KNEE

## 2024-06-17 NOTE — PLAN OF CARE
Problem: Chronic Conditions and Co-morbidities  Goal: Patient's chronic conditions and co-morbidity symptoms are monitored and maintained or improved  Outcome: Progressing  Flowsheets (Taken 6/17/2024 0655)  Care Plan - Patient's Chronic Conditions and Co-Morbidity Symptoms are Monitored and Maintained or Improved:   Monitor and assess patient's chronic conditions and comorbid symptoms for stability, deterioration, or improvement   Collaborate with multidisciplinary team to address chronic and comorbid conditions and prevent exacerbation or deterioration   Update acute care plan with appropriate goals if chronic or comorbid symptoms are exacerbated and prevent overall improvement and discharge     Problem: Pain  Goal: Verbalizes/displays adequate comfort level or baseline comfort level  Outcome: Progressing  Flowsheets (Taken 6/17/2024 0655)  Verbalizes/displays adequate comfort level or baseline comfort level:   Encourage patient to monitor pain and request assistance   Assess pain using appropriate pain scale   Administer analgesics based on type and severity of pain and evaluate response   Implement non-pharmacological measures as appropriate and evaluate response     Problem: ABCDS Injury Assessment  Goal: Absence of physical injury  Flowsheets (Taken 6/17/2024 0655)  Absence of Physical Injury: Implement safety measures based on patient assessment

## 2024-06-18 PROBLEM — E11.9 DM (DIABETES MELLITUS) (HCC): Status: ACTIVE | Noted: 2024-06-18

## 2024-06-18 PROBLEM — S82.002A CLOSED FRACTURE OF LEFT PATELLA: Status: ACTIVE | Noted: 2024-06-18

## 2024-06-18 PROBLEM — R11.2 NAUSEA & VOMITING: Status: RESOLVED | Noted: 2024-06-11 | Resolved: 2024-06-18

## 2024-06-18 PROBLEM — K59.00 CONSTIPATION: Status: RESOLVED | Noted: 2024-06-11 | Resolved: 2024-06-18

## 2024-06-18 PROBLEM — E87.20 METABOLIC ACIDOSIS: Status: RESOLVED | Noted: 2024-06-11 | Resolved: 2024-06-18

## 2024-06-18 PROBLEM — E10.10 DIABETIC KETOACIDOSIS WITHOUT COMA ASSOCIATED WITH TYPE 1 DIABETES MELLITUS (HCC): Status: RESOLVED | Noted: 2024-06-12 | Resolved: 2024-06-18

## 2024-06-18 LAB
ANION GAP SERPL CALC-SCNC: 10 MMOL/L (ref 3–18)
BUN SERPL-MCNC: 13 MG/DL (ref 7–18)
BUN/CREAT SERPL: 18 (ref 12–20)
CALCIUM SERPL-MCNC: 9 MG/DL (ref 8.5–10.1)
CHLORIDE SERPL-SCNC: 102 MMOL/L (ref 100–111)
CO2 SERPL-SCNC: 27 MMOL/L (ref 21–32)
CREAT SERPL-MCNC: 0.73 MG/DL (ref 0.6–1.3)
ERYTHROCYTE [DISTWIDTH] IN BLOOD BY AUTOMATED COUNT: 12 % (ref 11.6–14.5)
GLUCOSE BLD STRIP.AUTO-MCNC: 151 MG/DL (ref 70–110)
GLUCOSE BLD STRIP.AUTO-MCNC: 190 MG/DL (ref 70–110)
GLUCOSE BLD STRIP.AUTO-MCNC: 297 MG/DL (ref 70–110)
GLUCOSE BLD STRIP.AUTO-MCNC: 367 MG/DL (ref 70–110)
GLUCOSE BLD STRIP.AUTO-MCNC: 383 MG/DL (ref 70–110)
GLUCOSE SERPL-MCNC: 295 MG/DL (ref 74–99)
HCT VFR BLD AUTO: 33.4 % (ref 35–45)
HGB BLD-MCNC: 11.1 G/DL (ref 12–16)
MAGNESIUM SERPL-MCNC: 2 MG/DL (ref 1.6–2.6)
MCH RBC QN AUTO: 29.9 PG (ref 24–34)
MCHC RBC AUTO-ENTMCNC: 33.2 G/DL (ref 31–37)
MCV RBC AUTO: 90 FL (ref 78–100)
NRBC # BLD: 0 K/UL (ref 0–0.01)
NRBC BLD-RTO: 0 PER 100 WBC
PLATELET # BLD AUTO: 451 K/UL (ref 135–420)
PMV BLD AUTO: 9.7 FL (ref 9.2–11.8)
POTASSIUM SERPL-SCNC: 3.6 MMOL/L (ref 3.5–5.5)
RBC # BLD AUTO: 3.71 M/UL (ref 4.2–5.3)
SODIUM SERPL-SCNC: 139 MMOL/L (ref 136–145)
WBC # BLD AUTO: 4 K/UL (ref 4.6–13.2)

## 2024-06-18 PROCEDURE — 2580000003 HC RX 258: Performed by: INTERNAL MEDICINE

## 2024-06-18 PROCEDURE — 6360000002 HC RX W HCPCS: Performed by: HOSPITALIST

## 2024-06-18 PROCEDURE — 80048 BASIC METABOLIC PNL TOTAL CA: CPT

## 2024-06-18 PROCEDURE — 82962 GLUCOSE BLOOD TEST: CPT

## 2024-06-18 PROCEDURE — 83735 ASSAY OF MAGNESIUM: CPT

## 2024-06-18 PROCEDURE — 1100000000 HC RM PRIVATE

## 2024-06-18 PROCEDURE — 6370000000 HC RX 637 (ALT 250 FOR IP): Performed by: HOSPITALIST

## 2024-06-18 PROCEDURE — 6370000000 HC RX 637 (ALT 250 FOR IP): Performed by: INTERNAL MEDICINE

## 2024-06-18 PROCEDURE — 85027 COMPLETE CBC AUTOMATED: CPT

## 2024-06-18 PROCEDURE — 36415 COLL VENOUS BLD VENIPUNCTURE: CPT

## 2024-06-18 RX ORDER — INSULIN LISPRO 100 [IU]/ML
0-16 INJECTION, SOLUTION INTRAVENOUS; SUBCUTANEOUS
Status: DISCONTINUED | OUTPATIENT
Start: 2024-06-18 | End: 2024-06-19 | Stop reason: HOSPADM

## 2024-06-18 RX ORDER — NITROFURANTOIN 25; 75 MG/1; MG/1
100 CAPSULE ORAL 2 TIMES DAILY
Qty: 14 CAPSULE | Refills: 0
Start: 2024-06-18 | End: 2024-06-25

## 2024-06-18 RX ORDER — FLUCONAZOLE 100 MG/1
200 TABLET ORAL ONCE
Status: COMPLETED | OUTPATIENT
Start: 2024-06-18 | End: 2024-06-18

## 2024-06-18 RX ORDER — INSULIN GLARGINE 100 [IU]/ML
20 INJECTION, SOLUTION SUBCUTANEOUS DAILY
Status: DISCONTINUED | OUTPATIENT
Start: 2024-06-18 | End: 2024-06-19 | Stop reason: HOSPADM

## 2024-06-18 RX ORDER — PROMETHAZINE HYDROCHLORIDE 25 MG/1
25 TABLET ORAL EVERY 6 HOURS PRN
COMMUNITY
Start: 2024-06-10

## 2024-06-18 RX ORDER — INSULIN LISPRO 100 [IU]/ML
0-16 INJECTION, SOLUTION INTRAVENOUS; SUBCUTANEOUS
Qty: 10 ML | Refills: 0
Start: 2024-06-18

## 2024-06-18 RX ORDER — OXYCODONE HYDROCHLORIDE 10 MG/1
10 TABLET ORAL EVERY 6 HOURS PRN
Status: ON HOLD | COMMUNITY
Start: 2024-06-05 | End: 2024-06-18 | Stop reason: HOSPADM

## 2024-06-18 RX ORDER — ONDANSETRON 8 MG/1
8 TABLET, ORALLY DISINTEGRATING ORAL EVERY 8 HOURS PRN
COMMUNITY
Start: 2024-05-31

## 2024-06-18 RX ORDER — INSULIN GLARGINE 100 [IU]/ML
20 INJECTION, SOLUTION SUBCUTANEOUS DAILY
Qty: 10 ML | Refills: 3
Start: 2024-06-19

## 2024-06-18 RX ORDER — SULFAMETHOXAZOLE AND TRIMETHOPRIM 800; 160 MG/1; MG/1
1 TABLET ORAL 2 TIMES DAILY
Status: ON HOLD | COMMUNITY
Start: 2024-06-07 | End: 2024-06-18 | Stop reason: HOSPADM

## 2024-06-18 RX ORDER — POTASSIUM CHLORIDE 20 MEQ/1
40 TABLET, EXTENDED RELEASE ORAL ONCE
Status: DISCONTINUED | OUTPATIENT
Start: 2024-06-18 | End: 2024-06-19 | Stop reason: HOSPADM

## 2024-06-18 RX ORDER — INSULIN LISPRO 100 [IU]/ML
5 INJECTION, SOLUTION INTRAVENOUS; SUBCUTANEOUS
Qty: 10 ML | Refills: 0
Start: 2024-06-18

## 2024-06-18 RX ORDER — DOCUSATE SODIUM 100 MG/1
100 CAPSULE, LIQUID FILLED ORAL 2 TIMES DAILY PRN
COMMUNITY

## 2024-06-18 RX ADMIN — ENOXAPARIN SODIUM 40 MG: 100 INJECTION SUBCUTANEOUS at 08:10

## 2024-06-18 RX ADMIN — FLUCONAZOLE 200 MG: 100 TABLET ORAL at 11:01

## 2024-06-18 RX ADMIN — INSULIN GLARGINE 20 UNITS: 100 INJECTION, SOLUTION SUBCUTANEOUS at 08:07

## 2024-06-18 RX ADMIN — INSULIN LISPRO 8 UNITS: 100 INJECTION, SOLUTION INTRAVENOUS; SUBCUTANEOUS at 17:16

## 2024-06-18 RX ADMIN — DOCUSATE SODIUM 200 MG: 100 CAPSULE, LIQUID FILLED ORAL at 08:10

## 2024-06-18 RX ADMIN — INSULIN LISPRO 16 UNITS: 100 INJECTION, SOLUTION INTRAVENOUS; SUBCUTANEOUS at 08:09

## 2024-06-18 RX ADMIN — ACETAMINOPHEN 650 MG: 325 TABLET ORAL at 02:32

## 2024-06-18 RX ADMIN — POLYETHYLENE GLYCOL 3350 17 G: 17 POWDER, FOR SOLUTION ORAL at 08:09

## 2024-06-18 RX ADMIN — PANTOPRAZOLE SODIUM 40 MG: 40 TABLET, DELAYED RELEASE ORAL at 06:38

## 2024-06-18 RX ADMIN — ACETAMINOPHEN 650 MG: 325 TABLET ORAL at 16:28

## 2024-06-18 RX ADMIN — ACETAMINOPHEN 650 MG: 325 TABLET ORAL at 22:07

## 2024-06-18 RX ADMIN — ACETAMINOPHEN 650 MG: 325 TABLET ORAL at 06:38

## 2024-06-18 RX ADMIN — INSULIN LISPRO 5 UNITS: 100 INJECTION, SOLUTION INTRAVENOUS; SUBCUTANEOUS at 08:08

## 2024-06-18 RX ADMIN — INSULIN LISPRO 5 UNITS: 100 INJECTION, SOLUTION INTRAVENOUS; SUBCUTANEOUS at 17:17

## 2024-06-18 RX ADMIN — POTASSIUM CHLORIDE 40 MEQ: 1500 TABLET, EXTENDED RELEASE ORAL at 08:09

## 2024-06-18 RX ADMIN — SODIUM CHLORIDE, PRESERVATIVE FREE 10 ML: 5 INJECTION INTRAVENOUS at 08:26

## 2024-06-18 ASSESSMENT — PAIN SCALES - GENERAL
PAINLEVEL_OUTOF10: 5
PAINLEVEL_OUTOF10: 5
PAINLEVEL_OUTOF10: 4
PAINLEVEL_OUTOF10: 5

## 2024-06-18 ASSESSMENT — PAIN DESCRIPTION - ORIENTATION
ORIENTATION: LEFT

## 2024-06-18 ASSESSMENT — PAIN DESCRIPTION - LOCATION
LOCATION: KNEE

## 2024-06-18 ASSESSMENT — PAIN - FUNCTIONAL ASSESSMENT: PAIN_FUNCTIONAL_ASSESSMENT: PREVENTS OR INTERFERES SOME ACTIVE ACTIVITIES AND ADLS

## 2024-06-18 ASSESSMENT — PAIN DESCRIPTION - DESCRIPTORS
DESCRIPTORS: ACHING
DESCRIPTORS: ACHING;DISCOMFORT

## 2024-06-18 NOTE — DISCHARGE SUMMARY
is within normal limits. The pulmonary vasculature is within normal limits. The lungs and pleural spaces are clear. The visualized bones and upper abdomen are age-appropriate.     No acute process on portable chest. Electronically signed by MAIKEL MORALES    CTA CHEST W WO CONTRAST    Result Date: 6/11/2024  INDICATION: Postop. No stool output for 10 days. CT pulmonary angiogram is performed with 2.5 mm collimation. 100 mL of nonionic IV Isovue-370 was administered for exam. 3D coronal and sagittal postprocessed images were performed for this examination. In addition, contrast-enhanced CT of the abdomen and pelvis is performed with 5 mm collimation. Sagittal and coronal reformatted images were also performed. CT dose reduction was achieved through use of a standardized protocol tailored for this examination and automatic exposure control for dose modulation. Chest: CTPA: The pulmonary arteries are well opacified and there is no evidence of pulmonary embolism. The thoracic aorta is normal in course and caliber and there is no aortic dissection. Lymph nodes: There is no axillary, mediastinal or hilar lymphadenopathy. Heart: The heart is normal in the size and there is no pericardial effusion. Lungs: The lung parenchyma is clear bilaterally. Pleura: The pleural spaces are normal. Bones: No lytic or sclerotic osseous lesion is visualized. Abdomen and pelvis: Liver: The liver is normal. Adrenals: Adrenal glands are normal. Pancreas: The pancreas is normal. Gallbladder: The gallbladder is normal. Kidneys: The kidneys are normal. Spleen: The spleen is normal. Lymph nodes. There is no conor hepatitis, mesenteric, retroperitoneal or pelvic lymphadenopathy. Bowel: No thickened or dilated loop of large or small bowel is visualized. There is a moderate amount of stool in the colon. Appendix: The appendix is normal. Urinary bladder: Urinary bladder is partially filled and grossly normal. Miscellaneous: There is no free

## 2024-06-18 NOTE — PLAN OF CARE
Problem: Chronic Conditions and Co-morbidities  Goal: Patient's chronic conditions and co-morbidity symptoms are monitored and maintained or improved  Outcome: Progressing  Flowsheets (Taken 6/18/2024 0548)  Care Plan - Patient's Chronic Conditions and Co-Morbidity Symptoms are Monitored and Maintained or Improved:   Monitor and assess patient's chronic conditions and comorbid symptoms for stability, deterioration, or improvement   Collaborate with multidisciplinary team to address chronic and comorbid conditions and prevent exacerbation or deterioration   Update acute care plan with appropriate goals if chronic or comorbid symptoms are exacerbated and prevent overall improvement and discharge     Problem: Safety - Adult  Goal: Free from fall injury  Outcome: Progressing     Problem: Pain  Goal: Verbalizes/displays adequate comfort level or baseline comfort level  Outcome: Progressing  Flowsheets (Taken 6/18/2024 0548)  Verbalizes/displays adequate comfort level or baseline comfort level:   Encourage patient to monitor pain and request assistance   Assess pain using appropriate pain scale   Administer analgesics based on type and severity of pain and evaluate response   Implement non-pharmacological measures as appropriate and evaluate response

## 2024-06-18 NOTE — CARE COORDINATION
MICHELE CASTILLO arranged non emergency transport to [place] [address] [place] [address] via [employer] ([phone]). Trip has until 1930 to be assigned, notation made pt to arrive to facility after 1830.     To get a status on the ride ([phone]) Trip# [id number].      
[facility], [facility]  and any HH agencies that accepts her insurance. CMS to send referrals and assist patient with establishing a PCP.       Patient stated her preference and agreed to the following two preliminary discharge plans:    1- If IPR is recommended, patient to discharge to [facility] and patient's mother the mode of transport.     2 - If HH services is recommended, discharge home with accepting HH agency that accept her insurance, and patient's mother the mode of transport.     SW continue to follow to assist with discharge needs.       ASHLEY Asif  Supervisee in Social Work  Care Management Department      ASHLEY Asif  Supervisee in Social Work  Care Management Department

## 2024-06-18 NOTE — PLAN OF CARE
Problem: Discharge Planning  Goal: Discharge to home or other facility with appropriate resources  Outcome: Progressing  Flowsheets (Taken 6/18/2024 0800)  Discharge to home or other facility with appropriate resources:   Identify barriers to discharge with patient and caregiver   Arrange for needed discharge resources and transportation as appropriate   Identify discharge learning needs (meds, wound care, etc)   Refer to discharge planning if patient needs post-hospital services based on physician order or complex needs related to functional status, cognitive ability or social support system     Problem: Chronic Conditions and Co-morbidities  Goal: Patient's chronic conditions and co-morbidity symptoms are monitored and maintained or improved  6/18/2024 1445 by Su Carey, RN  Outcome: Progressing  Flowsheets (Taken 6/18/2024 0800)  Care Plan - Patient's Chronic Conditions and Co-Morbidity Symptoms are Monitored and Maintained or Improved:   Monitor and assess patient's chronic conditions and comorbid symptoms for stability, deterioration, or improvement   Collaborate with multidisciplinary team to address chronic and comorbid conditions and prevent exacerbation or deterioration   Update acute care plan with appropriate goals if chronic or comorbid symptoms are exacerbated and prevent overall improvement and discharge  6/18/2024 0548 by Cathleen Coy, RN  Outcome: Progressing  Flowsheets (Taken 6/18/2024 0548)  Care Plan - Patient's Chronic Conditions and Co-Morbidity Symptoms are Monitored and Maintained or Improved:   Monitor and assess patient's chronic conditions and comorbid symptoms for stability, deterioration, or improvement   Collaborate with multidisciplinary team to address chronic and comorbid conditions and prevent exacerbation or deterioration   Update acute care plan with appropriate goals if chronic or comorbid symptoms are exacerbated and prevent overall improvement and discharge

## 2024-06-19 VITALS
RESPIRATION RATE: 16 BRPM | DIASTOLIC BLOOD PRESSURE: 63 MMHG | TEMPERATURE: 98.1 F | BODY MASS INDEX: 21.41 KG/M2 | HEIGHT: 63 IN | SYSTOLIC BLOOD PRESSURE: 103 MMHG | WEIGHT: 120.81 LBS | OXYGEN SATURATION: 100 % | HEART RATE: 114 BPM

## 2024-06-19 LAB
GLUCOSE BLD STRIP.AUTO-MCNC: 286 MG/DL (ref 70–110)
MAGNESIUM SERPL-MCNC: 1.9 MG/DL (ref 1.6–2.6)

## 2024-06-19 PROCEDURE — 6370000000 HC RX 637 (ALT 250 FOR IP): Performed by: HOSPITALIST

## 2024-06-19 PROCEDURE — 83735 ASSAY OF MAGNESIUM: CPT

## 2024-06-19 PROCEDURE — 6360000002 HC RX W HCPCS: Performed by: HOSPITALIST

## 2024-06-19 PROCEDURE — 82962 GLUCOSE BLOOD TEST: CPT

## 2024-06-19 PROCEDURE — 6370000000 HC RX 637 (ALT 250 FOR IP): Performed by: INTERNAL MEDICINE

## 2024-06-19 PROCEDURE — 36415 COLL VENOUS BLD VENIPUNCTURE: CPT

## 2024-06-19 RX ADMIN — INSULIN GLARGINE 20 UNITS: 100 INJECTION, SOLUTION SUBCUTANEOUS at 07:41

## 2024-06-19 RX ADMIN — ACETAMINOPHEN 650 MG: 325 TABLET ORAL at 06:46

## 2024-06-19 RX ADMIN — INSULIN LISPRO 5 UNITS: 100 INJECTION, SOLUTION INTRAVENOUS; SUBCUTANEOUS at 07:42

## 2024-06-19 RX ADMIN — INSULIN LISPRO 8 UNITS: 100 INJECTION, SOLUTION INTRAVENOUS; SUBCUTANEOUS at 07:41

## 2024-06-19 RX ADMIN — DOCUSATE SODIUM 200 MG: 100 CAPSULE, LIQUID FILLED ORAL at 07:43

## 2024-06-19 RX ADMIN — PANTOPRAZOLE SODIUM 40 MG: 40 TABLET, DELAYED RELEASE ORAL at 06:46

## 2024-06-19 RX ADMIN — ENOXAPARIN SODIUM 40 MG: 100 INJECTION SUBCUTANEOUS at 07:42

## 2024-06-19 ASSESSMENT — PAIN DESCRIPTION - DESCRIPTORS: DESCRIPTORS: ACHING

## 2024-06-19 ASSESSMENT — PAIN DESCRIPTION - ORIENTATION: ORIENTATION: LEFT

## 2024-06-19 ASSESSMENT — PAIN SCALES - GENERAL
PAINLEVEL_OUTOF10: 4
PAINLEVEL_OUTOF10: 5

## 2024-06-19 ASSESSMENT — PAIN DESCRIPTION - LOCATION: LOCATION: KNEE

## 2024-06-19 ASSESSMENT — PAIN SCALES - WONG BAKER: WONGBAKER_NUMERICALRESPONSE: HURTS A LITTLE BIT

## 2024-06-19 NOTE — PROGRESS NOTES
Patient A&O x 4, c/o minimal pain to LLE at the knee and declines pain medication at this time.  LLE immobilizer in place. Nonpitting edema noted at knee and surrounding area.  Patient with numbness or tingling in left foot, wiggling toes, and cap refill less than 3 secs. Purwick system leaked.  Cleaned patient and applied new brief, bedpad, gown, and changed wick and system.  800 clear, orange urine emptied,  LLE elevated on pillow.  Ongoing monitoring.             6/14/24  0148 - Patient c/o of pain to left knee and foot rating at 4/10, requesting Tylenol.  Tylenol 650 mg po given.  Ongoing monitoring.    0730 - Bedside and Verbal shift change report given to SANAM Saldaña (oncoming nurse) by SANAM Xie (offgoing nurse). Report included the following information Nurse Handoff Report, Intake/Output, and MAR.    
    Hospitalist Progress Note    Patient: Abhilash Suh MRN: [id number]  CSN: [id number]    YOB: 1985  Age: 38 y.o.  Sex: female    DOA: 6/11/2024 LOS:  LOS: 3 days          Chief Complaint:    constipation      Assessment/Plan       38-year-old type 1 diabetic with recent knee surgery, presented with nausea, vomiting and constipation     -UTI-cx neg  -Nausea and vomiting,resolved  -Metabolic acidosis, resolved  -Constipation, resolved  -S/p operative fixation of fracture of the left patella  -Diabetes mellitus type 1/DKA    Hypokalemia-repletion PO     Can stop IVF     Diet advanced     Can stop rocephin after next dose cx neg  Stop pyridium    Orders for PT and follow up for knee as per orthopedics, in chart     Continue basal and SSI     Pain control     PT as tolerated     DVT proph is asa BID    Disposition :  Active Hospital Problems    Diagnosis     UTI (urinary tract infection) [N39.0]     Diabetic ketoacidosis without coma associated with type 1 diabetes mellitus (HCC) [E10.10]     Metabolic acidosis [E87.20]     Nausea & vomiting [R11.2]     Constipation [K59.00]        Subjective:    Feeling ok  A little nauseated still  Trying to eat oatmeal this am  No vomiting  Knee pain controlled  Constipation relieved    Review of systems:    Constitutional: denies fevers, chills  Respiratory: denies SOB  Cardiovascular: denies chest pain  Gastrointestinal: denies vomiting, diarrhea      Vital signs/Intake and Output:  Visit Vitals  /78   Pulse 98   Temp 98.4 °F (36.9 °C) (Oral)   Resp 16   Ht 1.6 m (5' 3\")   Wt 60.1 kg (132 lb 7.9 oz)   SpO2 99%   BMI 23.47 kg/m²     Current Shift:  No intake/output data recorded.  Last three shifts:  06/12 1901 - 06/14 0700  In: 3500 [P.O.:1200; I.V.:2300]  Out: 1070 [Urine:950]    Exam:    General: Well developed, alert, NAD, OX3  CVS:Regular rate and rhythm, no M/R/G, S1/S2 heard, no thrill  Lungs:Clear to auscultation bilaterally, no wheezes, rhonchi, or 
    Hospitalist Progress Note    Patient: Abhilash Suh MRN: [id number]  CSN: [id number]    YOB: 1985  Age: 38 y.o.  Sex: female    DOA: 6/11/2024 LOS:  LOS: 1 day          Chief Complaint:    DKA      Assessment/Plan       38-year-old presented with nausea, vomiting and constipation     -UTI  -Nausea and vomiting, could be due to the above or other cause  -Metabolic acidosis, suspect starvation  -Constipation, in the postoperative.  After knee surgery a week ago  -S/p operative fixation of fracture of the left patella  -Diabetes mellitus type 1     Acisosis improved  Convert off insulin gtt  Start basal as she is on at home with premeal lispro  Clears for now  Change IVF to NS    Add pyridium for dysuria    Follow urine cx results    Continue rocephin and stool softeners  Continue IV Rocephin for UTI and follow-up urine culture  Pain control  Laxative for constipation     Disposition :2 days  Active Hospital Problems    Diagnosis     Metabolic acidosis [E87.20]     Nausea & vomiting [R11.2]     Constipation [K59.00]        Subjective:  I feel ok  Has had a lot of pain with urinating  No fevers or chills      Review of systems:    Constitutional: denies fevers, chills  Respiratory: denies SOB, cough  Cardiovascular: denies chest pain  Gastrointestinal: denies nausea, vomiting, diarrhea      Vital signs/Intake and Output:  Visit Vitals  /69   Pulse (!) 109   Temp 98.2 °F (36.8 °C) (Oral)   Resp 16   Ht 1.6 m (5' 3\")   Wt 60.1 kg (132 lb 7.9 oz)   SpO2 100%   BMI 23.47 kg/m²     Current Shift:  No intake/output data recorded.  Last three shifts:  06/10 1901 - 06/12 0700  In: 4215.7 [I.V.:2065.7]  Out: 650 [Urine:650]    Exam:    General: Well developed, alert, NAD, OX3  CVS:Regular rate and rhythm, no M/R/G, S1/S2 heard, no thrill  Lungs:Clear to auscultation bilaterally, no wheezes, rhonchi, or rales  Abdomen: Soft, Nontender, No distention, Normal Bowel sounds  Extremities: No C/C/E, pulses 
    Progress Note      Patient: Abhilash Suh               Sex: female          DOA: 6/11/2024       YOB: 1985      Age:  38 y.o.        LOS:  LOS: 7 days            Subjective:     Abhilash Suh is a 38 y.o. female who c/o no new ortho complaints. Pending rehab today. Has in postop appointment with Dr. Hicks scheduled for tomorrow, in office, and concerned about rehab allowing her to go.    Objective:      Visit Vitals  /76   Pulse (!) 113   Temp 97.9 °F (36.6 °C) (Oral)   Resp 16   Ht 1.6 m (5' 3\")   Wt 54.8 kg (120 lb 13 oz)   SpO2 100%   BMI 21.40 kg/m²       Physical Exam:   Dressing:  clean, dry, intact. Old mepilex bandage removed and new one placed  Wiggles Toes/Ankle  Foot sensation intact to light touch  No foot edema/ +1 Posterior Tibial Pulse    Intake and Output:  Current Shift:  No intake/output data recorded.  Last three shifts:  06/16 1901 - 06/18 0700  In: 450 [P.O.:450]  Out: 1000 [Urine:1000]  Voiding Status:  +void without need for phipps catheter    Lab/Data Reviewed:  Recent Labs     06/18/24  0955   HGB 11.1*   HCT 33.4*      K 3.6      CO2 27   BUN 13       Medications Reviewed    Assessment/Plan     Principal Problem:    Metabolic acidosis  Active Problems:    Nausea & vomiting    Constipation    UTI (urinary tract infection)    Diabetic ketoacidosis without coma associated with type 1 diabetes mellitus (HCC)    Status post surgery  Resolved Problems:    * No resolved hospital problems. *      Continue DVT prophylaxis  Continue limited weightbearing for transfers only  Maintain knee immobilizer  Ok to d/c from ortho standpoint  Recommend follow up tomorrow as scheduled or ASAP with Dr. Hicks in office for repeat xray.              The patient's plan of care was discussed with Dr. Hicks today as well and he is in agreement with above.   
  Hospitalist Progress Note-critical care note     Patient: Abhilash Suh MRN: [id number]  CSN: [id number]    YOB: 1985  Age: 38 y.o.  Sex: female    DOA: 6/11/2024 LOS:  LOS: 8 days            Chief complaint: uti , metabolic acidosis .     Assessment/Plan         Active Hospital Problems    Diagnosis Date Noted    DM (diabetes mellitus) (HCC) [E11.9] 06/18/2024    Closed fracture of left patella [S82.002A] 06/18/2024    Status post surgery [Z98.890] 06/15/2024    UTI (urinary tract infection) [N39.0] 06/12/2024      38-year-old type 1 diabetic with recent knee surgery, presented with nausea, vomiting and constipation     -UTI  Rocephin will complete 5 days tx   Continue po abx     -Nausea and vomiting,resolved  -Metabolic acidosis, resolved  -Constipation, resolved    -S/p operative fixation of fracture of the left patella  Fall precaution and continue pt/ot  mg bid for dvt prophy will hold it change to lovenox during her stay   Need rehab on aspirin 81 mg bid   Pvl negative for dvt     -Diabetes mellitus type 1/DKA dka resolved   Controlled per lantus and ssi  add premeal insulin low dose for better control glucose , continue hypoglycemia protocol   Will increase lantus and premeal insulin continue ssi        Hypokalemia-repletion PO ,will recheck k and mg ,replace as needed  replace mg        Subjective   feel fine ,       TIME: E/M Time spent with patient and patient care issues: [] 31-40 mins  [x] 41-49 mins  [] 50 mins or more.      Disposition :today   Review of systems:    General: No fevers or chills.  Cardiovascular: No chest pain or pressure. No palpitations.   Pulmonary: No shortness of breath.   Gastrointestinal: No nausea, vomiting.     Vital signs/Intake and Output:  Visit Vitals  /63   Pulse (!) 114   Temp 98.1 °F (36.7 °C) (Oral)   Resp 16   Ht 1.6 m (5' 3\")   Wt 54.8 kg (120 lb 13 oz)   SpO2 100%   BMI 21.40 kg/m²     Current Shift:  No intake/output data recorded.  Last three 
  Hospitalist Progress Note-critical care note     Patient: Abhilash Suh MRN: [id number]  CSN: [id number]    YOB: 1985  Age: 38 y.o.  Sex: female    DOA: 6/11/2024 LOS:  LOS: 5 days            Chief complaint: uti , metabolic acidosis .     Assessment/Plan         Active Hospital Problems    Diagnosis Date Noted    Status post surgery [Z98.890] 06/15/2024    UTI (urinary tract infection) [N39.0] 06/12/2024    Diabetic ketoacidosis without coma associated with type 1 diabetes mellitus (HCC) [E10.10] 06/12/2024    Metabolic acidosis [E87.20] 06/11/2024    Nausea & vomiting [R11.2] 06/11/2024    Constipation [K59.00] 06/11/2024      38-year-old type 1 diabetic with recent knee surgery, presented with nausea, vomiting and constipation     -UTI  Rocephin will complete 5 days tx   -Nausea and vomiting,resolved  -Metabolic acidosis, resolved  -Constipation, resolved    -S/p operative fixation of fracture of the left patella  Fall precaution and continue pt/ot  mg bid for dvt prophy will hold it change to lovenox during her stay   Need rehab on aspirin     -Diabetes mellitus type 1/DKA dka resolved   Controlled per lantus and ssi  add premeal insulin low dose for better control glucose , continue hypoglycemia protocol      Hypokalemia-repletion PO ,will recheck k and mg ,replace as needed        Subjective   some pain       TIME: E/M Time spent with patient and patient care issues: [] 31-40 mins  [x] 41-49 mins  [] 50 mins or more.      Disposition :waiting for replacement   Review of systems:    General: No fevers or chills.  Cardiovascular: No chest pain or pressure. No palpitations.   Pulmonary: No shortness of breath.   Gastrointestinal: No nausea, vomiting.     Vital signs/Intake and Output:  Visit Vitals  /80   Pulse (!) 110   Temp 98.4 °F (36.9 °C) (Oral)   Resp 16   Ht 1.6 m (5' 3\")   Wt 60.1 kg (132 lb 7.9 oz)   SpO2 100%   BMI 23.47 kg/m²     Current Shift:  No intake/output data 
  Physical Therapy Goals:  Initiated 6/13/2024 to be met within 7-10 days.  Short Term Goals  Short Term Goal 1: Patient will perform supine to/from sit with Supervision  Short Term Goal 2: Patient will perform sit to/from stand with Supervision in preperation for gait progression  Short Term Goal 3: Patient will ambulate 30 ft with RW and CGA to progress OOB mobility  Short Term Goal 4: Patient will negotiate 1 stairs with handrails and CGA to simulate home entry    []  Patient has met MD meng breaux for d/c home   []  Recommend HH with 24 hour adult care   [x]  Benefit from additional acute PT session to address:  cont OOB activity      PHYSICAL THERAPY TREATMENT    Patient: Abhilash Suh (38 y.o. female)  Date: 6/13/2024  Diagnosis: Starvation ketoacidosis [T73.0XXA, E87.29]  Metabolic acidosis [E87.20]  Nausea and vomiting, unspecified vomiting type [R11.2] Metabolic acidosis      Precautions: Fall Risk, Weight Bearing,  , Left Lower Extremity Weight Bearing: Non Weight Bearing,  ,  ,  ,  ,    PLOF: not walking since patella fx, urinating in a cup    ASSESSMENT:  Assessment  Assessment: Pt supine in bed upon arrival.  KI in place.  Min/modA moving LLE in/out of bed.  Pt able to maintain NWB for sit to stand. Rests L foot on the floor otherwise.  Attemped swiveling and hopping on the unaffected LE with RW, pt having difficulty progressing feet.  Returned back to supine in bed.  Activity Tolerance: Patient limited by pain  Discharge Recommendations: IP Rehab    Progression toward goals:   []      Improving appropriately and progressing toward goals  [x]      Improving slowly and progressing toward goals  []      Not making progress toward goals and plan of care will be adjusted     PLAN:  Patient continues to benefit from skilled intervention to address the above impairments.  Continue treatment per established plan of care.    Further Equipment Recommendations for Discharge:   Discharge 
  Physical Therapy Goals:  Initiated 6/14/2024 to be met within 7-10 days.  Short Term Goals  Short Term Goal 1: Patient will perform supine to/from sit with Supervision  Short Term Goal 2: Patient will perform sit to/from stand with Supervision in preperation for gait progression  Short Term Goal 3: Patient will ambulate 30 ft with RW and CGA to progress OOB mobility  Short Term Goal 4: Patient will negotiate 1 stairs with handrails and CGA to simulate home entry    []  Patient has met MD meng breaux for d/c home   []  Recommend HH with 24 hour adult care   [x]  Benefit from additional acute PT session to address:  cont OOB activity      PHYSICAL THERAPY TREATMENT    Patient: Abhilash Suh (38 y.o. female)  Date: 6/14/2024  Diagnosis: Starvation ketoacidosis [T73.0XXA, E87.29]  Metabolic acidosis [E87.20]  Nausea and vomiting, unspecified vomiting type [R11.2] Metabolic acidosis      Precautions: Fall Risk, Weight Bearing,  , Left Lower Extremity Weight Bearing: Non Weight Bearing,  ,  ,  ,  ,    PLOF: non ambulatory since patella fx    ASSESSMENT:  Assessment  Assessment: Pt supine in bed upon arrival.  Sade with LLE to sit up EOB.  Pt transferred to BSC via swiveling on the RLE and use of RW.  Pt rests L foot on the floor but does not wt bear on it.  Pt staying on BSC while bed linens changed due to leaking pure wick.  Pt transferred back to EOB with CGA and swiveling.  ModA with LLE into bed.  Activity Tolerance: Patient tolerated treatment well  Discharge Recommendations: IP Rehab    Progression toward goals:   []      Improving appropriately and progressing toward goals  [x]      Improving slowly and progressing toward goals  []      Not making progress toward goals and plan of care will be adjusted     PLAN:  Patient continues to benefit from skilled intervention to address the above impairments.  Continue treatment per established plan of care.    Further Equipment Recommendations for Discharge: 
  Physical Therapy Goals:  Initiated 6/15/2024 to be met within 7-10 days.  Short Term Goals  Short Term Goal 1: Patient will perform supine to/from sit with Supervision  Short Term Goal 2: Patient will perform sit to/from stand with Supervision in preperation for gait progression  Short Term Goal 3: Patient will ambulate 30 ft with RW and CGA to progress OOB mobility  Short Term Goal 4: Patient will negotiate 1 stairs with handrails and CGA to simulate home entry    []  Patient has met MD meng breaux for d/c home   []  Recommend HH with 24 hour adult care   [x]  Benefit from additional acute PT session to address:  OOB activity      PHYSICAL THERAPY TREATMENT    Patient: Abhilash Suh (38 y.o. female)  Date: 6/15/2024  Diagnosis: Starvation ketoacidosis [T73.0XXA, E87.29]  Metabolic acidosis [E87.20]  Nausea and vomiting, unspecified vomiting type [R11.2] Metabolic acidosis      Precautions: Fall Risk, Weight Bearing,  , Left Lower Extremity Weight Bearing: Non Weight Bearing,  ,  ,  ,  ,    PLOF:     ASSESSMENT:  Assessment  Assessment: Pt in bed upon arrival.  Increased time and Chapis with LLE to EOB, modA with LLE while scooting hips forward so feet rest on the floor.  CGA for sit to stand.  Practiced swiveling and gently hopping on the RLE with RW for 3ft no LOB but increased difficulty.  At EOB pt performed RLE strengthening exercises.  ModA with LLE into bed then perform exercises on the LLE.  Pt left in bed, LLE on pillow, partially unfastened KI for ice application.  Activity Tolerance: Patient limited by pain  Discharge Recommendations: IP Rehab    Progression toward goals:   []      Improving appropriately and progressing toward goals  [x]      Improving slowly and progressing toward goals  []      Not making progress toward goals and plan of care will be adjusted     PLAN:  Patient continues to benefit from skilled intervention to address the above impairments.  Continue treatment per 
 conducted a follow up visit with Abhilash Suh, who is a 38 y.o.,female.      Continued the relationship of care and support.   Listened empathically.  Offered prayer and assurance of continued prayer on patients behalf.   Chart reviewed.  Patient expressed gratitude for Spiritual Care visit.    Chaplains will continue to follow and will provide pastoral care as needed or requested.   recommends bedside caregivers page the  on duty if patient shows signs of acute spiritual or emotional distress.     Sister Cary Gomez MA, [facility]     Spiritual Care  [phone]   
0830: Patient stated that her mom is going to pick her up to take her to her appt with MD Hicks and then transport to [facility]. MICHELE Mendez and MD Moraes made aware.     0900: Report called to Taya MARION  
1930  Bedside and Verbal shift change report given to SANAM Lebron (oncoming nurse) by Jan (offgoing nurse). Report included the following information Nurse Handoff Report, Adult Overview, Intake/Output, MAR, and Recent Results.     2005  Patient in room from ICU.    Patient in bed awake, A/Ox4, speech clear, hearing intact, ambulatory with walker, continent of urine and stool. On room air, lung sounds clear, respirations unlabored. Skin is warm, dry, and intact. Dressings clean, dry, and intact, with immobilizer to left leg. Radial and pedal pulses present bilaterally, capillary refill <3 seconds to fingers and toes. Abdomen soft, bowel sounds active. Moderate hand  noted to bilateral upper extremities. Side rails x3 up, bed locked and in lowest position, bed alarm on, call bell and bedside table within reach, needs attended, denies any pain, SOB, or concerns at present.    2035  Patient complaining of nausea and vomiting. Patient already had dose of zofran earlier. Pagealejandrina GRIFFIN for Phenergan orders. Orders received for phenergan IV 12.5 mg IV one dose.    2300  Patient reports relief with phenergan.    0230  Gave miralax and colace for constipation.    0612  Pericare and bed bath done. Linens and gown changed.     
19:55  Assessment completed.Lungs are clear bilat. IS = 2000. Dsg on LLE remains C/D/I with CMS & PP intact. Denies pain or discomfort in leg & calves. Immobilizer remains in place. Ice pack filled & applied. Resting quietly in bed, talking on her phone.    22:45 Shift assessment completed. See nsg flow sheet for details.    02:50 Reassessed with 0 changes noted. Resting quietly in bed between cares.    07:15 Bedside and Verbal shift change report given to Maria M Hinds RN\"S (oncoming nurse) by LAURA Baca RN (offgoing nurse). Report included the following information Nurse Handoff Report.   
Bedside and Verbal shift change report given to Jay MARION (oncoming nurse). Report included the following information SBAR, Nurse Handoff Report, Index, Adult Overview, Intake/Output, MAR, and Recent Results.     
Bedside and Verbal shift change report given to Niecy RODRIGUEZ RN (oncoming nurse). Report included the following information, SBAR, Nurse Handoff Report, Index, Adult Overview, Surgery Report, Intake/Output, MAR, and Recent Results.     
Bedside and Verbal shift change report given to SANAM Aggarwal (oncoming nurse) by JOHN SHAW RN   (offgoing nurse). Report included the following information Nurse Handoff Report, Intake/Output, MAR, and Recent Results.     
Bedside and Verbal shift change report given to SANAM Blackman (oncoming nurse) by JOHN SHAW RN   (offgoing nurse). Report included the following information Nurse Handoff Report, Intake/Output, MAR, and Recent Results.     
Bedside and Verbal shift change report given to SANAM Franz (oncoming nurse) by SANAM Lebron (offgoing nurse). Report included the following information Nurse Handoff Report, Adult Overview, Intake/Output, MAR, and Recent Results.       
Bedside and Verbal shift change report received from SANAM Blue.     1940    Called and verified with transport team, ETA to facility within an hour.      2150    Followed up with transport team, new ETA within 20 minutes.    2230    Followed up with transport team, new request for transport placed since it's already been hours behind previous time request; new window period between 30 min to 3H from now.     Informed [facility] of updates; they won't be able to accept patient tonight since cut off time for accepting patients at [phone]; April E, night shift RN supervisor confirmed that they can accept patient not earlier than 1100 tomorrow; charge RN and RN supervisor made aware. Patient aware of changes.     0725    Bedside and Verbal shift change report given to SANAM Robles (oncoming nurse) by SANAM Lopes (offgoing nurse). Report included the following information Nurse Handoff Report, Adult Overview, Intake/Output, MAR, and Recent Results.     
Blood sugar is 65, and 64.  Apple juice given.  Will recheck sugar in 15 minutes.   
Care  assisting with Discharge Planning for patient along with Clarissa Queen RN Care Mgr.  Referrals and updates sent to [facility] and [facility] for consideration for placement.    Will follow.  
Clinical update sent to [facility].  
Clinical update sent to [facility].  
Discharge Summary and discharge order available. CMS sent [facility] the discharge summary via [facility] Port.    Patient discharge plan remains as: Patient confirmed the discharge planning as: Patient will discharge to [facility] [address], [place] [address] and her mother to transport.       Patient to be received at [facility] after 6:30 pm     Nurse report to call report to [facility] at [phone]       Patient has not additional CM needs.       Vanessa Marinelli, MSW  Supervisee in Social Work  Care Management Department   
Ice applied to left knee - per request.  
Ice removed from left knee  
JACQUELINE called and spoke with [facility] Josy # [phone], who advised to start insurance authorization the OT consult not is needed and for criteria for placement at their facility, patient needs to not have symptoms of nausea & vomiting. Josy reports she is scheduled to meet patient at bedside today. SW to continue to follow.      Vanessa Marinelli, MSW  Supervisee in Social Work  Care Management Department     
JACQUELINE made contact with [facility], Josy # [phone], who confirmed the insurance authorization has been submitted and it is still pending approval for placement. Josy report that if authorization, they may potentially be able to receive patient over the weekend.       Authorization for placement pending with insurance for [facility] [address], [place] [address]     Weekend CM will be updated.     Patient may potential discharge over the weekend. CM department to continue to follow.       ASHLEY Asif  Supervisee in Social Work  Care Management Department    
JACQUELINE notified hospitalist, Dr. Paul, for discharge planning of insurance authorization received for patient's placement at [facility], and they will accept patient today after 6:30 pm.     JACQUELINE pending the nurse report # from [facility], once received JACQUELINE will update patient's floor nurse.     CM department to continue to follow.      ASHLEY Asif  Supervisee in Social Work  Care Management Department    
JACQUELINE received more information from [facility] regarding receiving patient. Patient will go to room 1122 and to call the report to Tyrinda at [phone].    JACQUELINE updated the floor nurse of the report # and informed her that hospitalist, Dr. Paul, has been notified of the acceptance and JACQUELINE requested the discharge summary and discharge order.      Patient discharge plan remains as: Patient confirmed the discharge planning as: Patient will discharge to [facility] [address], [place] [address] and her mother to transport.        Nurse report to call report to Tyrinda at [phone]     department to continue to follow.     ASHLEY Aisf  Supervisee in Social Work  Care Management Department    
JACQUELINE received notification from unit secretary that patient requires nonemergency transport. JACQUELINE inquired with patient and confirmed that she now require the nonemergency transportation.  department to arrange for the transport.      Nonemergency transportation arranged through [employer] # [phone] for a transport time after 6:30pm. The transport number received is trip # [id number].     Medicaid requires at least a two hour window for transport eta is not currently available.       Patient discharge plan is as follows: Patient will discharge to Room 1122 at [facility] [address] [place] [address] and transported by nonemergency Medicaid transport, which has been arranged by  department.     Nurse report to call report to Meghana at [phone]     JACQUELINE provided unit secretary with the transportation packet and update the floor nurse.    JACQUELINE also met with patient at bedside and patient advised that she would keep her follow up appointment with Dr. Patricia in the morning and her mother to transport her.     Vanessa Marinelli, MSW  Supervisee in Social Work  Care Management Department        
Just applied ice to left knee - per pt request.  Pt stated pain better  
Medicaid ride did not transport patient last night as scheduled and patient has a 9:30 am appointment with Dr. Patricia today. JACQUELINE spoke with [facility] #[phone], who advised they can still accept patient today and the discharge summary is acceptable; no additional summary is needed; and she will update [facility] with the new report number.       8:55 am - JACQUELINE met with patient at bedside and patient confirmed her mother will transport to her scheduled appointment this morning and then to [facility].       JACQUELINE received the [facility] Nurse report #, which is to call Taya at [phone]. JACQUELINE updated the charge nurse, Juliet, of the report number.       Patient discharge plan is as follows: Patient will discharge to Room 1122 at [facility] [address], [place] [address] and transported by her mother.      Nurse report # call Taya at [phone]      Patient to discharge today.      Vanessa Marinelli, MSW  Supervisee in Social Work  Care Management Department    
OT note and updated clinicals sent to [facility] facilities and request [facility] to start insurance authorization for placement.     Vanessa Marinelli MSW  Supervisee in Social Work  Care Management Department   
Occupational Therapy Goals:  Initiated 6/13/2024 to be met within 7-10 days.  Short Term Goals  Time Frame for Short Term Goals: 7 days  Short Term Goal 1: The patient will demonstrate ability to complete LB dressing tasks at supervision level with use of AE as needed.  Short Term Goal 2: The patient will demonstrate ability to complete LB bathing tasks at supervision level with use of AE as needed.  Short Term Goal 3: The patient will demonstrate ability to complete toilet transfers at supervision level.  Short Term Goal 4: The patient will demonstrate ability to complete toileting tasks at supervision level.    OCCUPATIONAL THERAPY EVALUATION    Patient: Abhilash Suh (38 y.o. female)  Date: 6/13/2024  Primary Diagnosis: Starvation ketoacidosis [T73.0XXA, E87.29]  Metabolic acidosis [E87.20]  Nausea and vomiting, unspecified vomiting type [R11.2]       Precautions: Fall Risk, Weight Bearing,  , Left Lower Extremity Weight Bearing: Non Weight Bearing, Knee immobilizer to be donned at all times  PLOF: Pt reports that since L patella ORIF with Dr. Hicks on 6/4/24, she has had increased difficulty caring for self at home. Pt states that she lives alone with her 3 children (17. 16. 10). Pt states that she started to get weak at home and had increased SOB. Pt stated that \"it got so bad to the point where she started to urinate in a cup at home\". Pt was admitted with UTI, early DKA, and metabolic acidosis (suspected starvation).     ASSESSMENT : RN cleared pt for therapy evaluation. Upon arrival, pt was found semi-reclined in bed with KI donned to LLE, L PIV running, and pure wick. OT educated pt on the role of occupational therapy within the hospital setting. Pt was agreeable to occupational therapy evaluation. Pt completed supine to sit with min A for LLE mgmt. Pt demonstrated ability to sit EOB with BUE support and SBA. Pt required CGA to stand from EOB (vc's for accurate hand placement). Once standing, pt 
PAC stated that it is ok to remove ace wrap, but keep Mepilex in place.  Will do when the bowel movements slow down.  
PHYSICAL THERAPY TREATMENT    Patient: Abhilash Suh (38 y.o. female)  Date: 6/16/2024  Diagnosis: Starvation ketoacidosis [T73.0XXA, E87.29]  Metabolic acidosis [E87.20]  Nausea and vomiting, unspecified vomiting type [R11.2] Metabolic acidosis      Precautions: Fall Risk, Weight Bearing,  , Left Lower Extremity Weight Bearing: Non Weight Bearing,  ,  ,  ,  ,      ASSESSMENT:  Pt presented in semi fowlers position in bed upon arrival w/ ice pack to loosened KI L knee.  Pt rating pain in 6/10 using numerical pain scale and w/ c/o L hip/back discomfort.  Warm packs provided for pt to dec pain L hip/back.  Pt agreeable and motivated to participate in PT.  Ed provided on use of KI to A w/ supine<>sit transfer but still required min A for L LE management.  Pt sat EOB x25' and able to perform R hip flex, LAQ, ankle pumps B.  Pt reported feeling \"hot\" and given cold pack for head.  Sit<>stand trials x3 w/ ability to maintain L NWB x1' each.  Pt required seated rest periods intermittently throughout session.  Pt participated in limited gait training using RW, NWB KI L LE, GB and CGA additional time w/ shuffling pattern on R LE.  Pt reports dec pain w/ standing at L hip/back.  Pt returned to supine in bed w/ all needs within reach.  Encouraged pt to sit up EOB/chair for meals.  Applied ice pack to L foot and knee (KI just loosened not doffed).  Recommend IPR upon hospital d/c.  Nurse Maria M aware of session and outcomes.    Progression toward goals:   []      Improving appropriately and progressing toward goals  [x]      Improving slowly and progressing toward goals  []      Not making progress toward goals and plan of care will be adjusted     PLAN:  Patient continues to benefit from skilled intervention to address the above impairments.  Continue treatment per established plan of care.    Further Equipment Recommendations for Discharge: none    AMPA:   AM-PAC Inpatient Mobility without Stair Climbing Raw Score : 
PT recommends IPR. From initial assessment, patient preference is IPR vs HH. [facility] has accepted patient should patient does not meet the criteria for IPR. CMS already sent a referral to [facility]. SW notified therapy and nurse manager, occupational is needed.    SW met with patient at bedside, updated patient regarding [facility] acceptance for HH, and confirmed her preference is IPR - [facility]. Patient reiterated that she would prefer IPR with [facility] and gave FOC verbal consent to also sent referral to [facility] in [place]. CMS to send the referral. Patient pending OT evaluation. SW to continue to follow.       Vanessa Marinelli, MSW  Supervisee in Social Work  Care Management Department    
Patient A&O x 4, c/o minimal pain to LLE at the knee and declines pain medication at this time.  LLE immobilizer in place. Nonpitting edema noted at knee and surrounding area.  Patient denies numbness or tingling, wiggling toes, and cap refill less than 3 secs. As per, PAC Chasity, ace wrap removed, cotton gauze remains intact and immobilizer replaced.  Patient had liquid stool x 2 passing flatus after receiving miralax and lactulose.  VS stable, tachycardic, and afebrile.  LLE elevated on pillow.  Ongoing monitoring.    6/14/24    3226 - Bedside and Verbal shift change report given to SANAM Logan (oncoming nurse) by SANAM Xie (offgoing nurse). Report included the following information Nurse Handoff Report, Intake/Output, and MAR.    
Patient complaining of a very uncomfortable tightness in her left leg, that extends from her lower thigh to her foot.  Dr. Jiang notified, and an ultrasound ordered of both legs.  
Patient has been having multiple small to moderate sized semi-formed and liquid bm's since the lactulose was given.  She did not need the enema. Continue to encourage patient to eat, as her blood sugar was low at 1700. She is now on a regular 4 carbl diet.  
Patient is not medically cleared for discharge, yet. JACQUELINE sent updated clinicals to [facility]. Pending insurance authorization for IPR placement. SW to continue to follow.       Vanessa Marinelli MSW  Supervisee in Social Work  Care Management Department    
Patient taken to vascular lab.  
Physical Therapy      1102: Pt sound asleep, will follow up again for PT.    1250: Pt on the phone and eating lunch, will follow up as schedule permits.    
Physical Therapy    1113: Pt resting, easily aroused but stated she did not sleep last night and wants to rest, will follow up as schedule permits.    
Physical Therapy    1412: Pt reports her stomach is not feeling good today, has been on and off the BSC all day.  Reports she just got back to the bed.  Transferring to [facility] today.    
Physical Therapy Goals:  Initiated 6/12/2024 to be met within 7-10 days.  Short Term Goals  Short Term Goal 1: Patient will perform supine to/from sit with Supervision  Short Term Goal 2: Patient will perform sit to/from stand with Supervision in preperation for gait progression  Short Term Goal 3: Patient will ambulate 30 ft with RW and CGA to progress OOB mobility  Short Term Goal 4: Patient will negotiate 1 stairs with handrails and CGA to simulate home entry  PHYSICAL THERAPY EVALUATION    Patient: Abhilash Suh (38 y.o. female)  Date: 6/12/2024  Diagnosis: Starvation ketoacidosis [T73.0XXA, E87.29]  Metabolic acidosis [E87.20]  Nausea and vomiting, unspecified vomiting type [R11.2] Metabolic acidosis  Precautions: Fall Risk, Weight Bearing,  , Left Lower Extremity Weight Bearing: Non Weight Bearing, Knee Immobilizer  PLOF: Independent ambulation without AD.     ASSESSMENT :  Patient received supine in bed. Pt presents with decreased LE strength, decreased endurance, decreased gait quailty, decreased bed mobility and transfers , and impaired balance. Recent surgery with Dr. Hicks patellar ORIF on 6/4/24. Pt reports \"MD told me not to put weight through the leg.\" Per op note from 6/4 surgery \"She will maintain limited weightbearing activity, flexion, maintaining knee immobilizer for any weightbearing transition.\" Patient performed supine to sit with Sade. Patient perform sit to/from stand with Sade and KI donned. Deferred gait training at this time however perform multiple stand transfers and seated there ex. Patient motivated to improve functional mobility to independent PLOF. Patient appears below baseline level and would benefit from continued skilled PT to progress functional mobility.     DEFICITS/IMPAIRMENTS:    , Body Structures, Functions, Activity Limitations Requiring Skilled Therapeutic Intervention: Decreased functional mobility ;Decreased ROM;Decreased strength;Decreased sensation;Decreased 
Referral placed to [facility].  
SW met with patient at bedside regarding the discharge planning. [facility] insurance authorization pending. Patient reported that she fell at a car dealership and has sought , and inquired about whether the [facility] medical cost potentially could be reconciled with  for legal actions should her medical insurance denied placement. JACQUELINE encourage to directly call [facility]hab facility directly to discuss. Patient reiterated her preference is [facility] vs  services. Patient reports her mother would transport her to the [facility] facility.    Pending insurance authorization for [facility]. May be potential discharge over the weekend if authorization is received for placement. Patient's mother is the mode of transport.    CM department to continue to follow.    ASHLEY Asif  Supervisee in Social Work  Care Management Department        
SW met with patient at bedside to provide an update regarding insurance authorization for [facility], which is still pending.  Patient discharge plan is as follows:    When insurance authorization is received and patient medically ready patient will discharge to [facility] [address], [place] [address] and her mother to transport.       JACQUELINE will continue to follow.     ASHLEY Asif  Supervisee in Social Work  Care Management Department   
SW met with patient at bedside to update regarding authorization received and [facility] ([facility]) will accept her after 6:3 pm today. Patient advised she has a follow up appointment scheduled with Dr. Patricia for tomorrow morning at 9:30am. After researching with [facility], patient was notified to cancel the follow up appointment; and [facility] would complete discharge planning with patient from their facility and arrange for the post-discharge outpatient services.    Patient confirmed the discharge planning as: Patient will discharge to [facility] [address], [place] [address] and her mother to transport.        Vanessa Marinelli MSW  Supervisee in Social Work  Care Management Department   
SW received notification [facility] ([facility]), Josy # [phone], that the insurance authorization is still pending and it very randomly they receive insurance authorization over the weekend.     4:25pm-JACQUELINE met with patient at bedside to provided the update and updated the whiteboard. Patient may potentially be here over the weekend.     Therapy recommends IPR. Patient's  insurance is pending authorization for placement.     Patient's preference and agreed upon discharge plan is as follows:    When insurance authorization is received and patient medically ready patient will discharge to [facility] [address], [place] [address] and her mother to transport.       Weekend CM will be updated and advised to contact  [facility] weekend Rep Josy at [phone] for an update.   CM department to continue to follow.       ASHLEY Asif  Supervisee in Social Work  Care Management Department    
SW [facility] Josy, that they can accept patient after 6:30 pm today. After IDR, SW will present to patient beside to update and confirm the discharge plan.       ASHLEY Asif  Supervisee in Social Work  Care Management Department    
SW received [facility],  that authorization received but pending admission and staff availability; they will have more information about accepting patient later today. SW to continue to follow.       ASHLEY Asif  Supervisee in Social Work  Care Management Department    
SW sent updated clinicals to referral. Insurance authorization is still pending for [facility]. SW to continue to follow.     ASHLEY Asif  Supervisee in Social Work  Care Management Department    
SW sent updated physician notes and pt notes to referrals.    CM department to continue to follow.     ASHLEY Asif  Supervisee in Social Work  Care Management Department    
rhonchi, or rales  Abdomen: Soft, Nontender, No distention, Normal Bowel sounds  Extremities: No C/C/E, pulses palpable 2+  Neuro:grossly normal , follows commands  Psych:appropriate    Labs: Results:       Chemistry Recent Labs     06/11/24  1310 06/11/24  2128 06/12/24  0230 06/12/24  1214 06/13/24  0516   GLUCOSE 268*   < > 172* 262* 184*      < > 142 142 141   K 4.5   < > 3.6 3.6 3.5      < > 117* 115* 111   CO2 15*   < > 21 19* 24   BUN 19*   < > 12 8 4*   CREATININE 0.82   < > 0.69 0.51* 0.28*   CALCIUM 8.6   < > 8.1* 8.1* 7.9*   BILITOT 0.5  --   --   --   --    AST 12  --   --   --   --    ALT 14  --   --   --   --    ALKPHOS 94  --   --   --   --    GLOB 4.0  --   --   --   --    LABGLOM >90   < > >90 >90 >90    < > = values in this interval not displayed.      CBC w/Diff Recent Labs     06/11/24  1310 06/13/24  0516   WBC 3.9* 4.3*   RBC 4.11* 3.27*   HGB 12.2 9.8*   HCT 37.5 29.5*   * 347   LYMPHOPCT 36 34      Cardiac Enzymes No results for input(s): \"CKTOTAL\", \"CKMB\", \"CKMBINDEX\", \"TROPONINI\" in the last 72 hours.    Invalid input(s): \"LISA\"   Coagulation No results for input(s): \"PROTIME\", \"INR\", \"APTT\" in the last 72 hours.    Lipid Panel No results found for: \"CHOL\", \"TRIG\", \"HDL\", \"VLDL\", \"CHOLHDLRATIO\"   BNP No results for input(s): \"BNP\" in the last 72 hours.   Liver Enzymes Recent Labs     06/11/24  1310   ALT 14   AST 12   ALKPHOS 94   BILITOT 0.5      Thyroid Studies No results found for: \"R4LBIKY\", \"R3VLRFR\", \"FT3\", \"T4FREE\", \"TSH\"       Procedures/imaging: see electronic medical records for all procedures/Xrays and details which were not copied into this note but were reviewed prior to creation of Plan      Luisa Liz MD  
nodes. There is no conor hepatitis, mesenteric, retroperitoneal or pelvic lymphadenopathy. Bowel: No thickened or dilated loop of large or small bowel is visualized. There is a moderate amount of stool in the colon. Appendix: The appendix is normal. Urinary bladder: Urinary bladder is partially filled and grossly normal. Miscellaneous: There is no free intraperitoneal fluid or gas. There is no focal fluid collection to suggest abscess.     1. No evidence pulmonary embolism. 2. No bowel obstruction, ileus or perforation. No intra-abdominal abscess. Moderate amount of stool in the colon.  Electronically signed by Carlos Garcia MD    [facility] XR TECHNOLOGIST SERVICE    Result Date: 6/10/2024  Radiology exam is complete. No Radiologist dictation. Please follow up with ordering provider.     XR KNEE LEFT (1-2 VIEWS)    Result Date: 6/5/2024  INTERPRETATION PROVIDED FOR COMPLIANCE ONLY AT NO CHARGE    KNEE LIMITED LT    Result Date: 5/21/2024  EXAM: KNEE LIMITED LT CLINICAL INDICATION/HISTORY: TRAUMA Additional: Pain after slip and fall injury COMPARISON: None TECHNIQUE: 2 views of the left knee FINDINGS: BONES: There is a comminuted fractures in the mid aspect of the patella. Nearly 3.5 cm of distraction of upper and lower moieties. SOFT TISSUES: Paucity of findings    Acute distracted left patellar fracture Signed By: Usman Lomax MD on 5/21/2024 11:18 PM      CHRISTIANA JACQUES MD    
osseous lesion is visualized. Abdomen and pelvis: Liver: The liver is normal. Adrenals: Adrenal glands are normal. Pancreas: The pancreas is normal. Gallbladder: The gallbladder is normal. Kidneys: The kidneys are normal. Spleen: The spleen is normal. Lymph nodes. There is no conor hepatitis, mesenteric, retroperitoneal or pelvic lymphadenopathy. Bowel: No thickened or dilated loop of large or small bowel is visualized. There is a moderate amount of stool in the colon. Appendix: The appendix is normal. Urinary bladder: Urinary bladder is partially filled and grossly normal. Miscellaneous: There is no free intraperitoneal fluid or gas. There is no focal fluid collection to suggest abscess.     1. No evidence pulmonary embolism. 2. No bowel obstruction, ileus or perforation. No intra-abdominal abscess. Moderate amount of stool in the colon.  Electronically signed by Carlos Garcia MD    NC XR TECHNOLOGIST SERVICE    Result Date: 6/10/2024  Radiology exam is complete. No Radiologist dictation. Please follow up with ordering provider.     XR KNEE LEFT (1-2 VIEWS)    Result Date: 6/5/2024  INTERPRETATION PROVIDED FOR COMPLIANCE ONLY AT NO CHARGE    KNEE LIMITED LT    Result Date: 5/21/2024  EXAM: KNEE LIMITED LT CLINICAL INDICATION/HISTORY: TRAUMA Additional: Pain after slip and fall injury COMPARISON: None TECHNIQUE: 2 views of the left knee FINDINGS: BONES: There is a comminuted fractures in the mid aspect of the patella. Nearly 3.5 cm of distraction of upper and lower moieties. SOFT TISSUES: Paucity of findings    Acute distracted left patellar fracture Signed By: Usman Lomax MD on 5/21/2024 11:18 PM      CHRISTIANA JACQUES MD

## 2024-06-19 NOTE — PLAN OF CARE
Problem: Pain  Goal: Verbalizes/displays adequate comfort level or baseline comfort level  6/19/2024 0300 by Marianne Dang RN  Outcome: Progressing  6/18/2024 1445 by Su Carey RN  Outcome: Progressing     Problem: Safety - Adult  Goal: Free from fall injury  6/19/2024 0300 by Marianne Dang RN  Outcome: Progressing  6/18/2024 1445 by Su Carey RN  Outcome: Progressing     Problem: Chronic Conditions and Co-morbidities  Goal: Patient's chronic conditions and co-morbidity symptoms are monitored and maintained or improved  6/19/2024 0300 by Marianne Dang RN  Outcome: Progressing  6/18/2024 1445 by Su Carey RN  Outcome: Progressing  Flowsheets (Taken 6/18/2024 0800)  Care Plan - Patient's Chronic Conditions and Co-Morbidity Symptoms are Monitored and Maintained or Improved:   Monitor and assess patient's chronic conditions and comorbid symptoms for stability, deterioration, or improvement   Collaborate with multidisciplinary team to address chronic and comorbid conditions and prevent exacerbation or deterioration   Update acute care plan with appropriate goals if chronic or comorbid symptoms are exacerbated and prevent overall improvement and discharge     Problem: Discharge Planning  Goal: Discharge to home or other facility with appropriate resources  6/19/2024 0300 by Marianne Dang RN  Outcome: Progressing  6/18/2024 1445 by Su Carey RN  Outcome: Progressing  Flowsheets (Taken 6/18/2024 0800)  Discharge to home or other facility with appropriate resources:   Identify barriers to discharge with patient and caregiver   Arrange for needed discharge resources and transportation as appropriate   Identify discharge learning needs (meds, wound care, etc)   Refer to discharge planning if patient needs post-hospital services based on physician order or complex needs related to functional status, cognitive ability or social support system

## 2024-07-12 PROBLEM — N39.0 UTI (URINARY TRACT INFECTION): Status: RESOLVED | Noted: 2024-06-12 | Resolved: 2024-07-12

## 2024-10-03 ENCOUNTER — HOSPITAL ENCOUNTER (EMERGENCY)
Facility: HOSPITAL | Age: 39
Discharge: HOME | End: 2024-10-03
Payer: MEDICAID

## 2024-10-03 VITALS
WEIGHT: 130 LBS | TEMPERATURE: 98.3 F | HEIGHT: 63 IN | RESPIRATION RATE: 16 BRPM | DIASTOLIC BLOOD PRESSURE: 76 MMHG | OXYGEN SATURATION: 100 % | SYSTOLIC BLOOD PRESSURE: 139 MMHG | HEART RATE: 90 BPM | BODY MASS INDEX: 23.04 KG/M2

## 2024-10-03 DIAGNOSIS — N30.11 INTERSTITIAL CYSTITIS (CHRONIC) WITH HEMATURIA: Primary | ICD-10-CM

## 2024-10-03 DIAGNOSIS — E11.65 HYPERGLYCEMIA DUE TO DIABETES MELLITUS (HCC): ICD-10-CM

## 2024-10-03 LAB
APPEARANCE UR: ABNORMAL
BACTERIA URNS QL MICRO: ABNORMAL /HPF
BILIRUB UR QL: ABNORMAL
COLOR UR: ABNORMAL
EPITH CASTS URNS QL MICRO: ABNORMAL /LPF (ref 0–5)
GLUCOSE BLD STRIP.AUTO-MCNC: 362 MG/DL (ref 70–110)
GLUCOSE UR STRIP.AUTO-MCNC: >1000 MG/DL
HCG UR QL: NEGATIVE
HGB UR QL STRIP: ABNORMAL
KETONES UR QL STRIP.AUTO: NEGATIVE MG/DL
LEUKOCYTE ESTERASE UR QL STRIP.AUTO: ABNORMAL
NITRITE UR QL STRIP.AUTO: POSITIVE
PH UR STRIP: 5.5 (ref 5–8)
PROT UR STRIP-MCNC: 300 MG/DL
RBC #/AREA URNS HPF: >100 /HPF (ref 0–5)
SP GR UR REFRACTOMETRY: 1.03 (ref 1–1.03)
UROBILINOGEN UR QL STRIP.AUTO: 1 EU/DL (ref 0.2–1)
WBC URNS QL MICRO: ABNORMAL /HPF (ref 0–5)

## 2024-10-03 PROCEDURE — 81001 URINALYSIS AUTO W/SCOPE: CPT

## 2024-10-03 PROCEDURE — 81025 URINE PREGNANCY TEST: CPT

## 2024-10-03 PROCEDURE — 99284 EMERGENCY DEPT VISIT MOD MDM: CPT

## 2024-10-03 PROCEDURE — 82962 GLUCOSE BLOOD TEST: CPT

## 2024-10-03 PROCEDURE — 96372 THER/PROPH/DIAG INJ SC/IM: CPT

## 2024-10-03 PROCEDURE — 87086 URINE CULTURE/COLONY COUNT: CPT

## 2024-10-03 PROCEDURE — 6360000002 HC RX W HCPCS: Performed by: PHYSICIAN ASSISTANT

## 2024-10-03 RX ORDER — KETOROLAC TROMETHAMINE 15 MG/ML
30 INJECTION, SOLUTION INTRAMUSCULAR; INTRAVENOUS ONCE
Status: COMPLETED | OUTPATIENT
Start: 2024-10-03 | End: 2024-10-03

## 2024-10-03 RX ADMIN — KETOROLAC TROMETHAMINE 30 MG: 15 INJECTION, SOLUTION INTRAMUSCULAR; INTRAVENOUS at 11:19

## 2024-10-03 ASSESSMENT — PAIN DESCRIPTION - LOCATION
LOCATION: GENERALIZED
LOCATION: ABDOMEN

## 2024-10-03 ASSESSMENT — PAIN SCALES - GENERAL
PAINLEVEL_OUTOF10: 10
PAINLEVEL_OUTOF10: 5

## 2024-10-03 ASSESSMENT — PAIN DESCRIPTION - DESCRIPTORS
DESCRIPTORS: ACHING
DESCRIPTORS: ACHING

## 2024-10-03 ASSESSMENT — PAIN - FUNCTIONAL ASSESSMENT: PAIN_FUNCTIONAL_ASSESSMENT: 0-10

## 2024-10-03 NOTE — ED TRIAGE NOTES
Patient ambulatory to ED c/o chronic dysuria. Denies any discharge and states that she has some hematuria while wiping possibly due to irritation.     Patient has significant urinary history and does see Dr. Edelmira Desai at [facility]. Has had multiple procedures of the bladder but pain is still aggravating and not alleviated.

## 2024-10-03 NOTE — DISCHARGE INSTRUCTIONS
Your urine does show components of infection, will send for urine culture before changing antibiotic  Continue your Bactrim, continue your Pyridium  Motrin, 200 mg, 2-3 every 6-8 hours as needed for discomfort  Tylenol, 500 mg, 2 every 6-8 hours as needed for pain control  Is important you stay well hydrated  Your blood sugar is high today.  Please use your sliding scale with insulin to keep your blood sugars below 200 at all times.  Your increased sugars could be contributing to your bladder pain.  Return to ER if you develop fever, flulike illness, vomiting, any new symptoms or concerns

## 2024-10-03 NOTE — ED PROVIDER NOTES
EMERGENCY DEPARTMENT HISTORY & PHYSICAL EXAM    10/3/24, 10:42 AM EDT    Clinical Impression:  1. Interstitial cystitis (chronic) with hematuria    2. Hyperglycemia due to diabetes mellitus (HCC)        Assessment/Differential Diagnosis:     Ddx UTI, interstitial cystitis, chronic pain, uncontrolled diabetes all considered    ED Course:   Initial assessment performed. The patients presenting problems have been discussed, and they are in agreement with the care plan formulated and outlined with them.  I have encouraged them to ask questions as they arise throughout their visit.    Patient comes the ED with suprapubic pain.  Patient has been dealing with suprapubic pain since June of this year.  Initially thought to be UTI but cultures have been negative for bacterial growth.  She did have a cystoscopy with urology in early September.  She has had follow-up with the doctor, diagnosed with interstitial cystitis, and Pyridium and Bactrim.  Patient woke this morning again with suprapubic pain that seemed a little worse than usual.  She did put a call out to her urologist who asked that she be seen in the ED.  There is been no fever, chills, flulike illness, shortness of breath, chest pain, nausea, vomiting or diarrhea.  No new symptoms.  No rash.  She denies any vaginal discharge, denies pregnancy.  Patient does have history of diabetes, takes insulin, does not check her blood sugars.    Exam with healthy-appearing adult female sitting in exam chair.  She appears comfortable no acute distress.  Vitals with no acute concern.  Heart regular rate and rhythm, lungs clear to auscultation, abdomen soft with mild suprapubic tenderness, no mass, no rebound or guarding.  Good bowel sounds throughout.  No CVA tenderness    UA with concern for UTI with nitrite, leukocyte Estrace, bacteria, white cells and red cells.  Review of previous urines appeared the same with negative urine culture

## 2024-10-04 LAB
BACTERIA SPEC CULT: NORMAL
SERVICE CMNT-IMP: NORMAL

## 2025-05-24 ENCOUNTER — HOSPITAL ENCOUNTER (EMERGENCY)
Facility: HOSPITAL | Age: 40
Discharge: HOME | End: 2025-05-24
Payer: MEDICAID

## 2025-05-24 VITALS
BODY MASS INDEX: 23.91 KG/M2 | HEART RATE: 89 BPM | WEIGHT: 134.92 LBS | RESPIRATION RATE: 16 BRPM | HEIGHT: 63 IN | OXYGEN SATURATION: 100 % | SYSTOLIC BLOOD PRESSURE: 119 MMHG | DIASTOLIC BLOOD PRESSURE: 75 MMHG | TEMPERATURE: 98.1 F

## 2025-05-24 DIAGNOSIS — S39.012A BACK STRAIN, INITIAL ENCOUNTER: Primary | ICD-10-CM
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
## 2025-05-24 PROCEDURE — 99282 EMERGENCY DEPT VISIT SF MDM: CPT

## 2025-05-24 ASSESSMENT — PAIN DESCRIPTION - PAIN TYPE: TYPE: ACUTE PAIN

## 2025-05-24 ASSESSMENT — PAIN DESCRIPTION - FREQUENCY: FREQUENCY: INTERMITTENT

## 2025-05-24 ASSESSMENT — PAIN DESCRIPTION - ORIENTATION: ORIENTATION: LEFT

## 2025-05-24 ASSESSMENT — PAIN DESCRIPTION - DESCRIPTORS: DESCRIPTORS: ACHING

## 2025-05-24 ASSESSMENT — PAIN SCALES - GENERAL: PAINLEVEL_OUTOF10: 8

## 2025-05-24 ASSESSMENT — PAIN DESCRIPTION - LOCATION: LOCATION: BACK

## 2025-05-24 ASSESSMENT — PAIN - FUNCTIONAL ASSESSMENT: PAIN_FUNCTIONAL_ASSESSMENT: 0-10
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
## (undated) DEVICE — SUTURE ABSORBABLE BRAIDED 1-0 OS-8 CR 3X18 IN UD VICRYL J757T

## (undated) DEVICE — THREADED GUIDE WIRE

## (undated) DEVICE — GLOVE SURG SZ 7 L12IN FNGR THK79MIL GRN LTX FREE

## (undated) DEVICE — Device

## (undated) DEVICE — PACK PROCEDURE SURG TOT KNEE CUST

## (undated) DEVICE — GLOVE SURG SZ 85 L12IN FNGR ORTHO 126MIL CRM LTX FREE

## (undated) DEVICE — SUTURE FIBERWIRE SZ 2 W/ TAPERED NEEDLE BLUE L38IN NONABSORB BLU L26.5MM 1/2 CIRCLE AR7200

## (undated) DEVICE — SUTURE VICRYL SZ 2-0 L18IN ABSRB VLT L26MM CT-2 1/2 CIR J726D

## (undated) DEVICE — CANNULATED DRILL

## (undated) DEVICE — ZIMMER® STERILE DISPOSABLE TOURNIQUET CUFF WITH PROTECTIVE SLEEVE AND PLC, SINGLE PORT, SINGLE BLADDER, 34 IN. (86 CM)

## (undated) DEVICE — GLOVE ORANGE PI 7   MSG9070

## (undated) DEVICE — GLOVE ORANGE PI 8 1/2   MSG9085

## (undated) DEVICE — GARMENT,MEDLINE,DVT,INT,CALF,MED, GEN2: Brand: MEDLINE

## (undated) DEVICE — APPLICATOR MEDICATED 26 CC SOLUTION HI LT ORNG CHLORAPREP

## (undated) DEVICE — SUTURE RETRIEVER

## (undated) DEVICE — HEWSON SUTURE RETRIEVER: Brand: HEWSON SUTURE RETRIEVER